# Patient Record
Sex: FEMALE | HISPANIC OR LATINO | Employment: UNEMPLOYED | URBAN - METROPOLITAN AREA
[De-identification: names, ages, dates, MRNs, and addresses within clinical notes are randomized per-mention and may not be internally consistent; named-entity substitution may affect disease eponyms.]

---

## 2017-01-31 ENCOUNTER — HOSPITAL ENCOUNTER (OUTPATIENT)
Dept: RADIOLOGY | Facility: MEDICAL CENTER | Age: 47
End: 2017-01-31

## 2017-05-19 ENCOUNTER — OFFICE VISIT (OUTPATIENT)
Dept: HEMATOLOGY ONCOLOGY | Facility: MEDICAL CENTER | Age: 47
End: 2017-05-19
Payer: MEDICAID

## 2017-05-19 VITALS
RESPIRATION RATE: 14 BRPM | DIASTOLIC BLOOD PRESSURE: 82 MMHG | SYSTOLIC BLOOD PRESSURE: 120 MMHG | OXYGEN SATURATION: 98 % | HEIGHT: 59 IN | WEIGHT: 121.25 LBS | HEART RATE: 66 BPM | TEMPERATURE: 97.9 F | BODY MASS INDEX: 24.44 KG/M2

## 2017-05-19 DIAGNOSIS — C16.9 METASTASIS FROM GASTRIC CANCER (HCC): ICD-10-CM

## 2017-05-19 DIAGNOSIS — C79.9 METASTASIS FROM GASTRIC CANCER (HCC): ICD-10-CM

## 2017-05-19 PROCEDURE — 99205 OFFICE O/P NEW HI 60 MIN: CPT | Performed by: INTERNAL MEDICINE

## 2017-05-19 RX ORDER — LORAZEPAM 0.5 MG/1
0.5 TABLET ORAL EVERY 4 HOURS PRN
Qty: 30 TAB | Refills: 0 | Status: SHIPPED | OUTPATIENT
Start: 2017-05-19 | End: 2017-05-19 | Stop reason: SDUPTHER

## 2017-05-19 RX ORDER — LORAZEPAM 0.5 MG/1
0.5 TABLET ORAL EVERY 8 HOURS PRN
Qty: 30 TAB | Refills: 1 | Status: SHIPPED | OUTPATIENT
Start: 2017-05-19 | End: 2017-07-17 | Stop reason: SDUPTHER

## 2017-05-19 ASSESSMENT — PAIN SCALES - GENERAL: PAINLEVEL: 2=MINIMAL-SLIGHT

## 2017-05-19 NOTE — PROGRESS NOTES
Consult Note: Oncology    Date of consultation: 5/19/2017 9:21 AM    Referring provider:     Reason for consultation: To establish care for metastatic gastric carcinoma. HER-2/elena negative,MSI-stable, low tumor mutational burden      History of presenting illness:       Melita Herrera   is a 46 y.o. year old  female who presented with epigastric pain and was found to have an adenocarcinomaof the greater curvature of the stomach measuring 4 x 3.8 cm on CT scan 8/15/16. The biopsy showed an adenocarcinoma with signet ring cells. She was referred for neoadjuvant chemotherapy and has received 3 cycles of TFOX with the last one his [facility] [place] [facility] on 10/27/16. Subsequent CT scan of the abdomen  on 10/20/60 which showed the 8 x 3 cm density on the ventral margin of the greater curvature of the stomach was relatively stable.      She was to be referred to her surgeon at [facility] who felt that Dr. Frederick would be a better option for her surgery and the patient was to have been called in surgery to be arranged. Unfortunately, this had not happened and she went to the emergency room at [facility] [place] [facility] on 12/23/16 with epigastric pain and evaluation was negative.    Unfortunately, there was some delay prior to her seeing Dr. Tyler. On, 1/25/17, the laparoscopic evaluation showed peritoneal spread and she was deemed inoperable. She received 2 more doses of TFOX. 4/19/17CT abdomen showed significant interval enlargement of irregular mass abutting the gastric wall and extending into the omentum with significant interval worsening of diffuse omental metastasis. Tiny 3 mm hypodense liver lesion possibly a new metastases along with a new 4 mm right lower lobe pulmonary nodule.    She wanted to switch her care. She was supposed to start Taxol Cyramza.  Foundation one testing showed p53 mutation and a few other mutation with no actionable mutations found.        Past Medical  History: No other past medical history No past medical history on file.    Past surgical history: Exploratory laparotomy as above No past surgical history on file.    Allergies:  Review of patient's allergies indicates no known allergies.    Medications:    Current Outpatient Prescriptions   Medication Sig Dispense Refill   • hydrocodone-acetaminophen (VICODIN) 5-500 MG TABS Take 1-2 Tabs by mouth every four hours as needed for 20 doses. 20 Each 0   • folic acid (FOLVITE) 1 MG TABS Take 1 mg by mouth every day.     • prenatal multivitamin (STUARTNATAL 1+1) 27-1 MG TABS Take 1 Tab by mouth every morning.       No current facility-administered medications for this visit.       Social History:     Social History     Social History   • Marital Status: Single     Spouse Name: N/A   • Number of Children: N/A   • Years of Education: N/A     Occupational History   • Not on file.     Social History Main Topics   • Smoking status: Never Smoker    • Smokeless tobacco: Not on file   • Alcohol Use: No   • Drug Use: No   • Sexual Activity: Not on file     Other Topics Concern   • Not on file     Social History Narrative       Family History:   No family history on file.    Review of Systems:  All other review of systems are negative except what was mentioned above in the HPI.    Constitutional: No fever, chills, weight loss ,malaise/fatigue.    HEENT: No new auditory or visual complaints. No sore throat and neck pain.     Respiratory:No new cough, sputum production, shortness of breath and wheezing.    Cardiovascular: No new chest pain, palpitations, orthopnea and leg swelling.    Gastrointestinal: No heartburn, nausea, vomiting ,, hematochezia or melena  . She has occasional abdominal discomfort    Genitourinary: Negative for dysuria, hematuria    Musculoskeletal: No new arthralgias or myalgias   Skin: Negative for rash and itching.    Neurological: Negative for focal weakness or headaches.    Endo/Heme/Allergies: No abnormal  "bleed/bruise.    Psychiatric/Behavioral: No new depression/anxiety.    Physical Exam:  Vitals:   /82 mmHg  Pulse 66  Temp(Src) 36.6 °C (97.9 °F)  Resp 14  Ht 1.499 m (4' 11.02\")  Wt 55 kg (121 lb 4.1 oz)  BMI 24.48 kg/m2  SpO2 98%  Breastfeeding? No    General: Not in acute distress, alert and oriented x 3  HEENT: No pallor, icterus. Oropharynx clear.   Neck: Supple, no palpable masses.  Lymph nodes: No palpable cervical, supraclavicular, axillary or inguinal lymphadenopathy.    CVS: regular rate and rhythm, no rubs or gallops  RESP: Clear to auscultate bilaterally, no wheezing or crackles.   ABD: Soft, non tender, non distended, positive bowel sounds, vague epigastric fullness  EXT: No edema or cyanosis  CNS: Alert and oriented x3, No focal deficits.  Skin- No rash      Labs:   No results for input(s): RBC, HEMOGLOBIN, HEMATOCRIT, PLATELETCT, PROTHROMBTM, APTT, INR, IRON, FERRITIN, TOTIRONBC in the last 72 hours.  Lab Results   Component Value Date/Time    SODIUM 133* 06/08/2012 11:24 AM    POTASSIUM 3.8 06/08/2012 11:24 AM    CHLORIDE 105 06/08/2012 11:24 AM    CO2 20 06/08/2012 11:24 AM    GLUCOSE 92 06/08/2012 11:24 AM    BUN 14 06/08/2012 11:24 AM    CREATININE 0.60 06/08/2012 11:24 AM        Assessment and Plan:  Metastatic gastric carcinoma-she was treated aggressively with Taxotere, 5-FU and oxaliplatin without major response. Unfortunately, there was some delay in her getting surgical evaluation early this year. She was deemed inoperable and had 2 more cycles of TFOX with further studies showing progression.     Had a long discussion with the patient through . Informed her that unfortunately she has a very aggressive cancer which has not responded to the standard agents. She is not a candidate for immunotherapy due to microsatellite stable pattern and low tumor mutational burden. P53 mutation also indicates poor prognosis. Discussed about the option of trying ramucirumab (Cyramza) at " 8 mg/kg every 2 weeks along with paclitaxel 80 mg/m² on day 1, 8, 15 of a 28 day cycle , which is the next FDA approved treatment. Informed her that most patients will have some stability without major response. Also discussed the potential adverse effects including but not limited to, neuropathy, fatigue infections, Cyramza induced to have worsening of cytopenias, small risk of perforation, vascular events including bleeding and clotting and hypertension.    She will receive chemotherapy education and she is agreeable to start treatment.     Complex patient requiring complex decision making. I have extensively reviewed her prior medical records as part of establishing care with me and formulated the plan. Total time spent 70 minutes.    Please note that this dictation was created using voice recognition software. I have made every reasonable attempt to correct obvious errors, but I expect that there are errors of grammar and possibly content that I did not discover before finalizing the note.      SIGNATURES:  Ramana Medrano    CC:  Pcp Pt States None  No ref. provider found

## 2017-05-19 NOTE — MR AVS SNAPSHOT
"        Melita Herrera   2017 9:00 AM   Office Visit   MRN: [id number]    Department:  [facility]   Dept Phone:  [phone]    Description:  Female : 1970   Provider:  Ramana Medrano M.D.           Reason for Visit     New Patient           Allergies as of 2017     No Known Allergies      You were diagnosed with     Metastasis from gastric cancer (CMS-HCC)   [0633820]         Vital Signs     Blood Pressure Pulse Temperature Respirations Height Weight    120/82 mmHg 66 36.6 °C (97.9 °F) 14 1.499 m (4' 11.02\") 55 kg (121 lb 4.1 oz)    Body Mass Index Oxygen Saturation Breastfeeding? Smoking Status          24.48 kg/m2 98% No Never Smoker         Basic Information     Date Of Birth Sex Race Ethnicity Preferred Language    1970 Female  or   Origin (Japanese,Norwegian,English,Congolese, etc) Japanese      Problem List              ICD-10-CM Priority Class Noted - Resolved    Metastasis from gastric cancer (CMS-HCC) C79.9, C16.9   2017 - Present      Health Maintenance     Patient has no pending health maintenance at this time      Current Immunizations     No immunizations on file.      Below and/or attached are the medications your provider expects you to take. Review all of your home medications and newly ordered medications with your provider and/or pharmacist. Follow medication instructions as directed by your provider and/or pharmacist. Please keep your medication list with you and share with your provider. Update the information when medications are discontinued, doses are changed, or new medications (including over-the-counter products) are added; and carry medication information at all times in the event of emergency situations     Allergies:  No Known Allergies          Medications  Valid as of: May 19, 2017 -  9:59 AM    Generic Name Brand Name Tablet Size Instructions for use    Folic Acid (Tab) FOLVITE 1 MG Take 1 mg by mouth every day.   "    Hydrocodone-Acetaminophen (Tab) VICODIN 5-500 MG Take 1-2 Tabs by mouth every four hours as needed for 20 doses.        LORazepam (Tab) ATIVAN 0.5 MG Take 1 Tab by mouth every 8 hours as needed for Anxiety.        Prenatal Vit-Fe Fumarate-FA (Tab) STUARTNATAL 1+1 27-1 MG Take 1 Tab by mouth every morning.        .                 Medicines prescribed today were sent to:     None      Medication refill instructions:       If your prescription bottle indicates you have medication refills left, it is not necessary to call your provider’s office. Please contact your pharmacy and they will refill your medication.    If your prescription bottle indicates you do not have any refills left, you may request refills at any time through one of the following ways: The online [employer] system (except Urgent Care), by calling your provider’s office, or by asking your pharmacy to contact your provider’s office with a refill request. Medication refills are processed only during regular business hours and may not be available until the next business day. Your provider may request additional information or to have a follow-up visit with you prior to refilling your medication.   *Please Note: Medication refills are assigned a new Rx number when refilled electronically. Your pharmacy may indicate that no refills were authorized even though a new prescription for the same medication is available at the pharmacy. Please request the medicine by name with the pharmacy before contacting your provider for a refill.        Your To Do List     Future Labs/Procedures Complete By Expires      As directed 5/19/2018    CBC WITH DIFFERENTIAL  As directed 5/19/2018    CEA  As directed 5/19/2018    COMP METABOLIC PANEL  As directed 5/19/2018         [employer] Status: Patient Declined

## 2017-05-23 ENCOUNTER — TELEPHONE (OUTPATIENT)
Dept: HEMATOLOGY ONCOLOGY | Facility: MEDICAL CENTER | Age: 47
End: 2017-05-23

## 2017-05-23 NOTE — TELEPHONE ENCOUNTER
RD consulted for dietary consult - patient's chemotherapy schedule is currently pending at this time.  RD to f/u with nutrition interview, along with Czech interpretor on patient's first day of chemotherapy treatment.  Please contact dietitian at [phone] with questions and/or concerns.

## 2017-05-23 NOTE — TELEPHONE ENCOUNTER
Addendum to documentation 5/23:  Per discussion with chemo education RN, patient will not receive active chemotherapy/Rtx at [facility] at this time d/t insurance reasons.  RD signing off at this time.  Please re-consult dietitian at [phone] prn.

## 2017-05-31 ENCOUNTER — TELEPHONE (OUTPATIENT)
Dept: HEMATOLOGY ONCOLOGY | Facility: MEDICAL CENTER | Age: 47
End: 2017-05-31

## 2017-05-31 ENCOUNTER — PATIENT OUTREACH (OUTPATIENT)
Dept: OTHER | Facility: MEDICAL CENTER | Age: 47
End: 2017-05-31

## 2017-05-31 NOTE — TELEPHONE ENCOUNTER
I received a call from a Maura, the patient's friend (ph: [phone]), stating the patient was in need of assistance. She was seen in our office once by Dr. Medrano in medical oncology but is now scheduled for chemo with no follow up visits in our office. She does not speak english very well and is having a very difficult time trying to navigate what she needs to be doing next and who she needs to be seeing. She is a Maori-speaking patient.     I spoke with Georgiana Aponte, medical oncology practice manager, in regards to this patient and she notified me that the patient has [place] but will be switching to [employer] insurance on 6/1/17.     I sent a message to Lashanda Rodrigez - Nurse Navigator, Rae Alejandro -  , and Renny Curry - financial counselor, and informed them all of the situation with this patient. Renny has been in contact with this patient and Lashanda and Rae have referrals that were sent their way.

## 2017-05-31 NOTE — PROGRESS NOTES
Through the use of an , spoke with pt regarding her concerns and understanding of the plan of care.  Pt stated that she has not had any chemo for 2 months.  She is worried that the tumors are growing because her pain is getting worse.  She has pain medicine that is helping her.  She stated that her pain has not been too bad, and she has enough medicine for about 1 week.  She is aware of her next chemo appointment on 6/14, and is concerned that she does not yet have another appointment with Dr. Medrano.  Explained that we would need to obtain prior authorization through the insurance before we can set up the appt, and this cannot be done until after the 1st.  Pt verbalized understanding.  Asked about current needs.  Pt indicated that she does not have money for food.  Explained that we may be able to help her obtain a daniel to help with the cost of food or other living expenses.  Told pt we would talk again in a few days.  Called and spoke with DAVID Lawson, who has been in contact with pt almost daily.  He plans to verify [employer] benefits tomorrow am.  He also stated that he received the applications back that he gave her.  He called her for additional information and to set up appointment.  Met with pt and Renny this pm.  Plan to continue to work with Renny and Rae to assist pt.  Contact information was given, and pt indicated no additional questions at this time.  [place]

## 2017-06-02 ENCOUNTER — PATIENT OUTREACH (OUTPATIENT)
Dept: OTHER | Facility: MEDICAL CENTER | Age: 47
End: 2017-06-02

## 2017-06-02 NOTE — PROGRESS NOTES
Pt called and spoke with Renny earlier today regarding increasing pain now radiating to the back.  Pt will be needing additional pain medication, but is still, technically, insured through [employer].  Spoke with Dr. Medrano regarding this and her increasing pain.  Dr. Medrano wrote for additional pain medication for pt.  Met with pt while she met with DAVID Lawson, who interpreted for ONN.  Pt was tearful when ONN arrived.  Renny later explained the situation at home that prompted the tears.  Pt stated that her pain has been at 10/10 several times over the last few days which is why she needed pain medication sooner that she originally stated.  She has been avoiding taking the pain medication because of the constipation she has been having.  Dr. Jordan had prescribed a stool softener which was not effective.  Explained to pt that she could add Miralax (frequently prescribed by Joanna and Dr. Medrano) to the stool softener to help her until her insurance situation is corrected.  Pt requested counseling to help with the children.  Informed pt that the [facility] has Palau[place] speaking counselors available, and enc her to try to get counseling there.  Renny plans to contact CRUZ Mcbride, upon her return to help pt obtain counseling and evaluate if other services are needed.  Pt stated that her oldest child brought home marijuana, and she is very concerned that her other children, especially the youngest, will be adversely effected by this.  Plan to follow up with Rae and Renny on 6/5/17 regarding progress with counseling and insurance.  dsw

## 2017-06-05 ENCOUNTER — TELEPHONE (OUTPATIENT)
Dept: HEMATOLOGY ONCOLOGY | Facility: MEDICAL CENTER | Age: 47
End: 2017-06-05

## 2017-06-05 NOTE — TELEPHONE ENCOUNTER
Hope called to inform our office to submit a STAT request for authorization for the patient's appointment in our office on June 13th. She informs me the previous office visit was approved and the patient's upcoming chemotherapy appointments are approved but we need to submit a new request for her next visit in our office.    Phone: [phone]  Fax: [phone]

## 2017-06-06 ENCOUNTER — TELEPHONE (OUTPATIENT)
Dept: HEMATOLOGY ONCOLOGY | Facility: MEDICAL CENTER | Age: 47
End: 2017-06-06

## 2017-06-06 NOTE — TELEPHONE ENCOUNTER
Spoke with Rayne, medical assistant for Karie Chaudhry at the [facility]. Informed her that we need their office to obtain a prior authorization for this patient so we can see her in our office at medical oncology. She agreed and our office phone number was given to her in case they had further questions.     Ph: [phone].

## 2017-06-09 ENCOUNTER — NON-PROVIDER VISIT (OUTPATIENT)
Dept: HEMATOLOGY ONCOLOGY | Facility: MEDICAL CENTER | Age: 47
End: 2017-06-09
Payer: MEDICAID

## 2017-06-09 DIAGNOSIS — C79.9 METASTASIS FROM GASTRIC CANCER (HCC): ICD-10-CM

## 2017-06-09 DIAGNOSIS — C16.9 METASTASIS FROM GASTRIC CANCER (HCC): ICD-10-CM

## 2017-06-09 RX ORDER — ONDANSETRON 4 MG/1
4 TABLET, FILM COATED ORAL EVERY 4 HOURS PRN
Qty: 30 TAB | Refills: 3 | Status: SHIPPED | OUTPATIENT
Start: 2017-06-09

## 2017-06-09 RX ORDER — PROCHLORPERAZINE MALEATE 10 MG
10 TABLET ORAL EVERY 6 HOURS PRN
Qty: 30 TAB | Refills: 3 | Status: SHIPPED | OUTPATIENT
Start: 2017-06-09

## 2017-06-09 NOTE — PROGRESS NOTES
Patient is established with Dr. Medrano for metaastatic colon cancer.  She will be starting on Taxol/Cyramza chemotherapy, and is here today for a pre-chemotherapy teaching appointment. Patient is accompanied by herself for today's visit.  A  was utilized during today's visit.    Educated patient on chemotherapy including but not limited to: Infusion Policies and procedures, cycling of chemotherapy, length of treatment, alopecia, myelosuppression, infection prevention, fatigue, GI distress, use of specific nausea medications, avoidance of alcoholic beverages and supplement medications, use of sunscreen, chemotherapy precautions at home, and invasive procedures. Patient was provided with drug specific handouts, [facility] chemotherapy and you book, [facility] eating hints book, [facility] side effects sheet. Patient was given tour of Infusion Services and shown where to park and check-in.     A total of 45 minutes was spent with this patient for chemotherapy education.

## 2017-06-09 NOTE — MR AVS SNAPSHOT
Melita Herrera   2017 1:30 PM   Non-Provider Visit   MRN: [id number]    Department:  [facility]   Dept Phone:  [phone]    Description:  Female : 1970   Provider:  ONC RN 1           Reason for Visit     Other chemo edu      Allergies as of 2017     No Known Allergies      Vital Signs     Smoking Status                   Never Smoker            Basic Information     Date Of Birth Sex Race Ethnicity Preferred Language    1970 Female  or   Origin (Uzbek,Citizen of [place],British,Jose, etc) Uzbek      Your appointments     2017  9:30 AM   Non Provider 1 with ONC MA 1   [facility] (--)    [address] [facility] [address], [address]  [place] [id number] [phone]           You will be receiving a confirmation call a few days before your appointment from our automated call confirmation system.            2017 11:00 AM   ONCOLOGY EST PATIENT 30 MIN with Joanna Aguilera A.P.NJose Elias   [facility] (--)    [address] [facility] [address], [address]  [place] [address] [phone]            2017  8:30 AM   New Chemo 3 with RN 4   [facility] ([facility] [place])    [address]  [place] [address]   [phone]            2017 11:30 AM   ONCOLOGY EST PATIENT 30 MIN with JON Jonas.P.NJose Elias   [facility] (--)    [address]  [place] [address]   [phone]            2017 11:30 AM   Est Chemo 3 with RN 4   [facility] ([facility] [place])    [address]  [place] [address]   [phone]            2017 10:30 AM   Est Chemo 3 with RN 3   [facility] ([facility] [place])    [address]  [place] [address]   [phone]            2017 10:00 AM   Non Provider 1 with ONC MA 1   [facility] (--)    [address], [address]  [place] [address]   [phone]           You will be receiving a confirmation call a few days before your appointment  from our automated call confirmation system.            Jul 11, 2017 11:00 AM   ONCOLOGY EST PATIENT 30 MIN with REKHA Jonas.   [facility] (--)    [address], [address]  [facility] [address]   [phone]            Jul 12, 2017  8:30 AM   Est Chemo 3 with RN 3   [facility] ([facility])    [address]  [place] [address]   [phone]            Jul 19, 2017  8:30 AM   Est Chemo 3 with RN 4   [facility] ([facility])    [address]  [place] [address]   [phone]            Jul 26, 2017 11:30 AM   Est Chemo 3 with RN 4   [facility] ([facility])    [address]  [facility] [address]   [phone]              Problem List              ICD-10-CM Priority Class Noted - Resolved    Metastasis from gastric cancer (CMS-HCC) C79.9, C16.9   5/19/2017 - Present      Health Maintenance        Date Due Completion Dates    IMM DTaP/Tdap/Td Vaccine (1 - Tdap) 12/11/1987 ---    PAP SMEAR 12/11/1989 ---    MAMMOGRAM 12/11/2008 ---            Current Immunizations     No immunizations on file.      Below and/or attached are the medications your provider expects you to take. Review all of your home medications and newly ordered medications with your provider and/or pharmacist. Follow medication instructions as directed by your provider and/or pharmacist. Please keep your medication list with you and share with your provider. Update the information when medications are discontinued, doses are changed, or new medications (including over-the-counter products) are added; and carry medication information at all times in the event of emergency situations     Allergies:  No Known Allergies          Medications  Valid as of: June 09, 2017 -  2:31 PM    Generic Name Brand Name Tablet Size Instructions for use    Folic Acid (Tab) FOLVITE 1 MG Take 1 mg by mouth every day.        Hydrocodone-Acetaminophen (Tab) VICODIN 5-500 MG Take 1-2 Tabs by mouth every four  hours as needed for 20 doses.        LORazepam (Tab) ATIVAN 0.5 MG Take 1 Tab by mouth every 8 hours as needed for Anxiety.        Ondansetron HCl (Tab) ZOFRAN 4 MG Take 1 Tab by mouth every four hours as needed for Nausea/Vomiting.        Prenatal Vit-Fe Fumarate-FA (Tab) STUARTNATAL 1+1 27-1 MG Take 1 Tab by mouth every morning.        Prochlorperazine Maleate (Tab) COMPAZINE 10 MG Take 1 Tab by mouth every 6 hours as needed.        .                 Medicines prescribed today were sent to:     None      Medication refill instructions:       If your prescription bottle indicates you have medication refills left, it is not necessary to call your provider’s office. Please contact your pharmacy and they will refill your medication.    If your prescription bottle indicates you do not have any refills left, you may request refills at any time through one of the following ways: The online [employer] system (except Urgent Care), by calling your provider’s office, or by asking your pharmacy to contact your provider’s office with a refill request. Medication refills are processed only during regular business hours and may not be available until the next business day. Your provider may request additional information or to have a follow-up visit with you prior to refilling your medication.   *Please Note: Medication refills are assigned a new Rx number when refilled electronically. Your pharmacy may indicate that no refills were authorized even though a new prescription for the same medication is available at the pharmacy. Please request the medicine by name with the pharmacy before contacting your provider for a refill.           [employer] Status: Patient Declined

## 2017-06-12 DIAGNOSIS — C16.9 METASTASIS FROM GASTRIC CANCER (HCC): ICD-10-CM

## 2017-06-12 DIAGNOSIS — C79.9 METASTASIS FROM GASTRIC CANCER (HCC): ICD-10-CM

## 2017-06-13 ENCOUNTER — NON-PROVIDER VISIT (OUTPATIENT)
Dept: HEMATOLOGY ONCOLOGY | Facility: MEDICAL CENTER | Age: 47
End: 2017-06-13
Payer: MEDICAID

## 2017-06-13 ENCOUNTER — OFFICE VISIT (OUTPATIENT)
Dept: HEMATOLOGY ONCOLOGY | Facility: MEDICAL CENTER | Age: 47
End: 2017-06-13
Payer: MEDICAID

## 2017-06-13 ENCOUNTER — TELEPHONE (OUTPATIENT)
Dept: HEMATOLOGY ONCOLOGY | Facility: MEDICAL CENTER | Age: 47
End: 2017-06-13

## 2017-06-13 ENCOUNTER — HOSPITAL ENCOUNTER (OUTPATIENT)
Facility: MEDICAL CENTER | Age: 47
End: 2017-06-13
Attending: INTERNAL MEDICINE
Payer: MEDICAID

## 2017-06-13 VITALS
OXYGEN SATURATION: 95 % | BODY MASS INDEX: 24.39 KG/M2 | RESPIRATION RATE: 16 BRPM | WEIGHT: 121 LBS | TEMPERATURE: 98.6 F | DIASTOLIC BLOOD PRESSURE: 80 MMHG | HEART RATE: 81 BPM | HEIGHT: 59 IN | SYSTOLIC BLOOD PRESSURE: 124 MMHG

## 2017-06-13 VITALS
DIASTOLIC BLOOD PRESSURE: 80 MMHG | BODY MASS INDEX: 24.59 KG/M2 | TEMPERATURE: 98.6 F | OXYGEN SATURATION: 95 % | HEIGHT: 59 IN | HEART RATE: 81 BPM | RESPIRATION RATE: 16 BRPM | SYSTOLIC BLOOD PRESSURE: 124 MMHG | WEIGHT: 121.98 LBS

## 2017-06-13 DIAGNOSIS — R10.9 LEFT FLANK PAIN: ICD-10-CM

## 2017-06-13 DIAGNOSIS — C16.9 METASTASIS FROM GASTRIC CANCER (HCC): ICD-10-CM

## 2017-06-13 DIAGNOSIS — C79.9 METASTASIS FROM GASTRIC CANCER (HCC): ICD-10-CM

## 2017-06-13 LAB
ALBUMIN SERPL BCP-MCNC: 3.6 G/DL (ref 3.2–4.9)
ALBUMIN/GLOB SERPL: 1.2 G/DL
ALP SERPL-CCNC: 88 U/L (ref 30–99)
ALT SERPL-CCNC: 13 U/L (ref 2–50)
ANION GAP SERPL CALC-SCNC: 8 MMOL/L (ref 0–11.9)
APPEARANCE UR: CLEAR
AST SERPL-CCNC: 24 U/L (ref 12–45)
B-HCG SERPL-ACNC: 0.9 MIU/ML (ref 0–5)
BASOPHILS # BLD AUTO: 0.2 % (ref 0–1.8)
BASOPHILS # BLD: 0.01 K/UL (ref 0–0.12)
BILIRUB SERPL-MCNC: 0.4 MG/DL (ref 0.1–1.5)
BILIRUB UR QL STRIP.AUTO: NEGATIVE
BUN SERPL-MCNC: 13 MG/DL (ref 8–22)
CALCIUM SERPL-MCNC: 9.3 MG/DL (ref 8.5–10.5)
CEA SERPL-MCNC: 1.2 NG/ML (ref 0–3)
CHLORIDE SERPL-SCNC: 104 MMOL/L (ref 96–112)
CO2 SERPL-SCNC: 26 MMOL/L (ref 20–33)
COLOR UR: YELLOW
CREAT SERPL-MCNC: 0.65 MG/DL (ref 0.5–1.4)
CULTURE IF INDICATED INDCX: NO UA CULTURE
EOSINOPHIL # BLD AUTO: 0.04 K/UL (ref 0–0.51)
EOSINOPHIL NFR BLD: 0.7 % (ref 0–6.9)
ERYTHROCYTE [DISTWIDTH] IN BLOOD BY AUTOMATED COUNT: 50.9 FL (ref 35.9–50)
GFR SERPL CREATININE-BSD FRML MDRD: >60 ML/MIN/1.73 M 2
GLOBULIN SER CALC-MCNC: 3.1 G/DL (ref 1.9–3.5)
GLUCOSE SERPL-MCNC: 121 MG/DL (ref 65–99)
GLUCOSE UR STRIP.AUTO-MCNC: NEGATIVE MG/DL
HCT VFR BLD AUTO: 39.4 % (ref 37–47)
HGB BLD-MCNC: 12.6 G/DL (ref 12–16)
IMM GRANULOCYTES # BLD AUTO: 0.03 K/UL (ref 0–0.11)
IMM GRANULOCYTES NFR BLD AUTO: 0.5 % (ref 0–0.9)
KETONES UR STRIP.AUTO-MCNC: NEGATIVE MG/DL
LEUKOCYTE ESTERASE UR QL STRIP.AUTO: NEGATIVE
LYMPHOCYTES # BLD AUTO: 0.75 K/UL (ref 1–4.8)
LYMPHOCYTES NFR BLD: 12.5 % (ref 22–41)
MCH RBC QN AUTO: 26.8 PG (ref 27–33)
MCHC RBC AUTO-ENTMCNC: 32 G/DL (ref 33.6–35)
MCV RBC AUTO: 83.8 FL (ref 81.4–97.8)
MICRO URNS: NORMAL
MONOCYTES # BLD AUTO: 0.38 K/UL (ref 0–0.85)
MONOCYTES NFR BLD AUTO: 6.3 % (ref 0–13.4)
NEUTROPHILS # BLD AUTO: 4.8 K/UL (ref 2–7.15)
NEUTROPHILS NFR BLD: 79.8 % (ref 44–72)
NITRITE UR QL STRIP.AUTO: NEGATIVE
NRBC # BLD AUTO: 0 K/UL
NRBC BLD AUTO-RTO: 0 /100 WBC
PH UR STRIP.AUTO: 5.5 [PH]
PLATELET # BLD AUTO: 332 K/UL (ref 164–446)
PMV BLD AUTO: 10.1 FL (ref 9–12.9)
POTASSIUM SERPL-SCNC: 4 MMOL/L (ref 3.6–5.5)
PROT SERPL-MCNC: 6.7 G/DL (ref 6–8.2)
PROT UR QL STRIP: NEGATIVE MG/DL
RBC # BLD AUTO: 4.7 M/UL (ref 4.2–5.4)
RBC UR QL AUTO: NEGATIVE
SODIUM SERPL-SCNC: 138 MMOL/L (ref 135–145)
SP GR UR STRIP.AUTO: 1.02
WBC # BLD AUTO: 6 K/UL (ref 4.8–10.8)

## 2017-06-13 PROCEDURE — 85025 COMPLETE CBC W/AUTO DIFF WBC: CPT

## 2017-06-13 PROCEDURE — 80053 COMPREHEN METABOLIC PANEL: CPT

## 2017-06-13 PROCEDURE — 84702 CHORIONIC GONADOTROPIN TEST: CPT

## 2017-06-13 PROCEDURE — 86304 IMMUNOASSAY TUMOR CA 125: CPT

## 2017-06-13 PROCEDURE — 36415 COLL VENOUS BLD VENIPUNCTURE: CPT | Performed by: NURSE PRACTITIONER

## 2017-06-13 PROCEDURE — 81003 URINALYSIS AUTO W/O SCOPE: CPT

## 2017-06-13 PROCEDURE — 99214 OFFICE O/P EST MOD 30 MIN: CPT | Performed by: NURSE PRACTITIONER

## 2017-06-13 PROCEDURE — 82378 CARCINOEMBRYONIC ANTIGEN: CPT

## 2017-06-13 ASSESSMENT — ENCOUNTER SYMPTOMS
CHILLS: 0
COUGH: 1
FEVER: 0
DIZZINESS: 0
VOMITING: 0
SHORTNESS OF BREATH: 0
PALPITATIONS: 0
WEIGHT LOSS: 0
NAUSEA: 0
FLANK PAIN: 1
BACK PAIN: 1
DIARRHEA: 0
HEADACHES: 0
CONSTIPATION: 0

## 2017-06-13 ASSESSMENT — PAIN SCALES - GENERAL
PAINLEVEL: 8=MODERATE-SEVERE PAIN
PAINLEVEL: 8=MODERATE-SEVERE PAIN

## 2017-06-13 NOTE — MR AVS SNAPSHOT
"Melita Herrera   2017 11:00 AM   Office Visit   MRN: [id number]    Department:  [facility]   Dept Phone:  [phone]    Description:  Female : 1970   Provider:  NASIM JonasPWILFREDO           Reason for Visit     Follow-Up           Allergies as of 2017     No Known Allergies      You were diagnosed with     Metastasis from gastric cancer (CMS-HCC)   [9316601]       Left flank pain   [863486]         Vital Signs     Blood Pressure Pulse Temperature Respirations Height Weight    124/80 mmHg 81 37 °C (98.6 °F) 16 1.499 m (4' 11.02\") 55.33 kg (121 lb 15.7 oz)    Body Mass Index Oxygen Saturation Smoking Status             24.62 kg/m2 95% Never Smoker          Basic Information     Date Of Birth Sex Race Ethnicity Preferred Language    1970 Female  or   Origin (Kazakh,Palestinian,Burundian,Jose, etc) Kazakh      Your appointments     2017  8:30 AM   New Chemo 3 with RN 4   Infusion Services ([facility])    [address] [address]   [phone]            2017 11:30 AM   ONCOLOGY EST PATIENT 30 MIN with NASIM JonasPWILFREDO   [facility] (--)    [address] [place] [id number] [phone]            2017 11:30 AM   Est Chemo 3 with RN 4   Infusion Services ([facility])    [address] [address]   [phone]            2017 10:30 AM   Est Chemo 3 with RN 3   Infusion Services ([facility])    [id number]  [facility] [address]   [phone]            2017 10:00 AM   Non Provider 1 with ONC MA 1   [facility] (--)    [address] [place] [address] [phone]           You will be receiving a confirmation call a few days before your appointment from our automated call confirmation system.            2017 11:00 AM   ONCOLOGY EST PATIENT 30 MIN with JON Jonas.PWILFREDO   [facility] (--)    [address], [address]  [place] [address]   [phone]            Jul 12, 2017  8:30 AM   Est Chemo 3 with RN 3   Infusion Services ([facility])    [address] [facility] [address]  [place] [address]   [phone]            Jul 19, 2017  8:30 AM   Est Chemo 3 with RN 4   Infusion Services ([facility])    [address] [facility] [address]  [place] [address]   [phone]            Jul 26, 2017 11:30 AM   Est Chemo 3 with RN 4   Infusion Services ([facility])    [address] [facility] [address]  [place] [address]   [phone]              Problem List              ICD-10-CM Priority Class Noted - Resolved    Metastasis from gastric cancer (CMS-HCC) C79.9, C16.9   5/19/2017 - Present      Health Maintenance        Date Due Completion Dates    IMM DTaP/Tdap/Td Vaccine (1 - Tdap) 12/11/1987 ---    PAP SMEAR 12/11/1989 ---    MAMMOGRAM 12/11/2008 ---            Current Immunizations     No immunizations on file.      Below and/or attached are the medications your provider expects you to take. Review all of your home medications and newly ordered medications with your provider and/or pharmacist. Follow medication instructions as directed by your provider and/or pharmacist. Please keep your medication list with you and share with your provider. Update the information when medications are discontinued, doses are changed, or new medications (including over-the-counter products) are added; and carry medication information at all times in the event of emergency situations     Allergies:  No Known Allergies          Medications  Valid as of: June 13, 2017 - 12:16 PM    Generic Name Brand Name Tablet Size Instructions for use    Folic Acid (Tab) FOLVITE 1 MG Take 1 mg by mouth every day.        Hydrocodone-Acetaminophen (Tab) VICODIN 5-500 MG Take 1-2 Tabs by mouth every four hours as needed for 20 doses.        LORazepam (Tab) ATIVAN 0.5 MG Take 1 Tab by mouth every 8 hours as needed for Anxiety.        Ondansetron HCl (Tab)  ZOFRAN 4 MG Take 1 Tab by mouth every four hours as needed for Nausea/Vomiting.        Prenatal Vit-Fe Fumarate-FA (Tab) STUARTNATAL 1+1 27-1 MG Take 1 Tab by mouth every morning.        Prochlorperazine Maleate (Tab) COMPAZINE 10 MG Take 1 Tab by mouth every 6 hours as needed.        .                 Medicines prescribed today were sent to:     [id number] - [place], [place] - [address] [facility]    [address] [place] [address]    Phone: [phone] Fax: [phone]    Open 24 Hours?: No      Medication refill instructions:       If your prescription bottle indicates you have medication refills left, it is not necessary to call your provider’s office. Please contact your pharmacy and they will refill your medication.    If your prescription bottle indicates you do not have any refills left, you may request refills at any time through one of the following ways: The online Jumblets system (except Urgent Care), by calling your provider’s office, or by asking your pharmacy to contact your provider’s office with a refill request. Medication refills are processed only during regular business hours and may not be available until the next business day. Your provider may request additional information or to have a follow-up visit with you prior to refilling your medication.   *Please Note: Medication refills are assigned a new Rx number when refilled electronically. Your pharmacy may indicate that no refills were authorized even though a new prescription for the same medication is available at the pharmacy. Please request the medicine by name with the pharmacy before contacting your provider for a refill.        Your To Do List     Future Labs/Procedures Complete By Expires    CT-ABDOMEN-PELVIS WITH & W/O  As directed 6/13/2018    HCG QUANTITATIVE  As directed 6/13/2018    URINALYSIS,CULTURE IF INDICATED  As directed 6/13/2018         Jumblets Status: Patient Declined

## 2017-06-13 NOTE — PROGRESS NOTES
Subjective:      Melita Herrera is a 46 y.o. female who presents for Follow-Up for gastric cancer.         HPI    Patient seen today in follow-up for gastric cancer. She is accompanied by her son for today's appointment. Bhutanese translation assisted with the  phone line today as well as a medical assistant in the office as needed throughout the appointment today.    Patient is scheduled to receive her first dose of Paclitaxel and Cyramza tomorrow. Patient to receive paclitaxel weekly and the Cyramza every other week. Patient denies fevers, chills, weight loss or fatigue. She is eating well. She denies pain or abdominal pain with eating. She denies chest pain, heart palpitations. She denies nausea, vomiting, diarrhea or constipation. Patient is voiding without difficulty. She does have significant left flank pain that started about a week ago. She stated she took Percocet with positive results sometimes, however she did take 2 pills yesterday which did not help her pain very much. She denies any hematuria or dysuria. She states her urine is clear and color. Patient denies dizziness or headaches.    No Known Allergies  Current Outpatient Prescriptions on File Prior to Visit   Medication Sig Dispense Refill   • hydrocodone-acetaminophen (VICODIN) 5-500 MG TABS Take 1-2 Tabs by mouth every four hours as needed for 20 doses. 20 Each 0   • ondansetron (ZOFRAN) 4 MG Tab tablet Take 1 Tab by mouth every four hours as needed for Nausea/Vomiting. 30 Tab 3   • prochlorperazine (COMPAZINE) 10 MG Tab Take 1 Tab by mouth every 6 hours as needed. 30 Tab 3   • lorazepam (ATIVAN) 0.5 MG Tab Take 1 Tab by mouth every 8 hours as needed for Anxiety. 30 Tab 1   • folic acid (FOLVITE) 1 MG TABS Take 1 mg by mouth every day.     • prenatal multivitamin (STUARTNATAL 1+1) 27-1 MG TABS Take 1 Tab by mouth every morning.       No current facility-administered medications on file prior to visit.         Review of Systems  "  Constitutional: Negative for fever, chills, weight loss and malaise/fatigue.   Respiratory: Positive for cough (very mid cough but it is intermittent). Negative for shortness of breath.    Cardiovascular: Negative for chest pain and palpitations.   Gastrointestinal: Negative for nausea, vomiting, diarrhea and constipation.   Genitourinary: Positive for flank pain (left flank pain started about 1 week ago. Pain medication does not help.). Negative for dysuria and hematuria.   Musculoskeletal: Positive for back pain.   Neurological: Negative for dizziness and headaches.          Objective:     /80 mmHg  Pulse 81  Temp(Src) 37 °C (98.6 °F)  Resp 16  Ht 1.499 m (4' 11.02\")  Wt 50.33 kg (110 lb 15.3 oz)  BMI 22.40 kg/m2  SpO2 95%     Physical Exam   Constitutional: She is oriented to person, place, and time. She appears well-developed and well-nourished. No distress.   HENT:   Head: Normocephalic and atraumatic.   Mouth/Throat: Oropharynx is clear and moist.   Eyes: Conjunctivae and EOM are normal. Pupils are equal, round, and reactive to light.   Cardiovascular: Normal rate, regular rhythm, normal heart sounds and intact distal pulses.  Exam reveals no gallop and no friction rub.    No murmur heard.  Pulmonary/Chest: Effort normal and breath sounds normal. No respiratory distress. She has no wheezes.   Abdominal: Soft. Bowel sounds are normal. She exhibits no distension. There is no tenderness.   Musculoskeletal: Normal range of motion. She exhibits tenderness (pain in the left flank area).   Neurological: She is alert and oriented to person, place, and time.   Skin: Skin is warm and dry. No rash noted. She is not diaphoretic. No erythema. No pallor.   Psychiatric: She has a normal mood and affect. Her behavior is normal.   Vitals reviewed.      Hospital Outpatient Visit on 06/13/2017   Component Date Value Ref Range Status   • Carcinoembryonic Antigen 06/13/2017 1.2  0.0 - 3.0 ng/mL Final    Comment: " Effective September 17, 2013 the quantitative determination  of Carcinoembryonic Antigen (CEA) will now be performed at  [facility].  The Access CEA paramagnetic  particle chemiluminescent immunoassay is used.  Results  obtained with different test methods or kits cannot be used interchangeably.  Measurement of CEA has been shown to be  clinically relevant in the management of patients with  colorectal, breast, lung, prostatic, pancreatic, and ovarian  carcinomas.  Smokers may have slightly elevated levels of CEA.     • WBC 06/13/2017 6.0  4.8 - 10.8 K/uL Final   • RBC 06/13/2017 4.70  4.20 - 5.40 M/uL Final   • Hemoglobin 06/13/2017 12.6  12.0 - 16.0 g/dL Final   • Hematocrit 06/13/2017 39.4  37.0 - 47.0 % Final   • MCV 06/13/2017 83.8  81.4 - 97.8 fL Final   • MCH 06/13/2017 26.8* 27.0 - 33.0 pg Final   • MCHC 06/13/2017 32.0* 33.6 - 35.0 g/dL Final   • RDW 06/13/2017 50.9* 35.9 - 50.0 fL Final   • Platelet Count 06/13/2017 332  164 - 446 K/uL Final   • MPV 06/13/2017 10.1  9.0 - 12.9 fL Final   • Neutrophils-Polys 06/13/2017 79.80* 44.00 - 72.00 % Final   • Lymphocytes 06/13/2017 12.50* 22.00 - 41.00 % Final   • Monocytes 06/13/2017 6.30  0.00 - 13.40 % Final   • Eosinophils 06/13/2017 0.70  0.00 - 6.90 % Final   • Basophils 06/13/2017 0.20  0.00 - 1.80 % Final   • Immature Granulocytes 06/13/2017 0.50  0.00 - 0.90 % Final   • Nucleated RBC 06/13/2017 0.00   Final   • Neutrophils (Absolute) 06/13/2017 4.80  2.00 - 7.15 K/uL Final    Includes immature neutrophils, if present.   • Lymphs (Absolute) 06/13/2017 0.75* 1.00 - 4.80 K/uL Final   • Monos (Absolute) 06/13/2017 0.38  0.00 - 0.85 K/uL Final   • Eos (Absolute) 06/13/2017 0.04  0.00 - 0.51 K/uL Final   • Baso (Absolute) 06/13/2017 0.01  0.00 - 0.12 K/uL Final   • Immature Granulocytes (abs) 06/13/2017 0.03  0.00 - 0.11 K/uL Final   • NRBC (Absolute) 06/13/2017 0.00   Final   • Sodium 06/13/2017 138  135 - 145 mmol/L Final   • Potassium  06/13/2017 4.0  3.6 - 5.5 mmol/L Final   • Chloride 06/13/2017 104  96 - 112 mmol/L Final   • Co2 06/13/2017 26  20 - 33 mmol/L Final   • Anion Gap 06/13/2017 8.0  0.0 - 11.9 Final   • Glucose 06/13/2017 121* 65 - 99 mg/dL Final   • Bun 06/13/2017 13  8 - 22 mg/dL Final   • Creatinine 06/13/2017 0.65  0.50 - 1.40 mg/dL Final   • Calcium 06/13/2017 9.3  8.5 - 10.5 mg/dL Final   • AST(SGOT) 06/13/2017 24  12 - 45 U/L Final   • ALT(SGPT) 06/13/2017 13  2 - 50 U/L Final   • Alkaline Phosphatase 06/13/2017 88  30 - 99 U/L Final   • Total Bilirubin 06/13/2017 0.4  0.1 - 1.5 mg/dL Final   • Albumin 06/13/2017 3.6  3.2 - 4.9 g/dL Final   • Total Protein 06/13/2017 6.7  6.0 - 8.2 g/dL Final   • Globulin 06/13/2017 3.1  1.9 - 3.5 g/dL Final   • A-G Ratio 06/13/2017 1.2   Final   • GFR If  06/13/2017 >60  >60 mL/min/1.73 m 2 Final   • GFR If Non  06/13/2017 >60  >60 mL/min/1.73 m 2 Final   • Color 06/13/2017 Yellow   Final   • Character 06/13/2017 Clear   Final   • Specific Gravity 06/13/2017 1.023  <1.035 Final   • Ph 06/13/2017 5.5  5.0-8.0 Final   • Glucose 06/13/2017 Negative  Negative mg/dL Final   • Ketones 06/13/2017 Negative  Negative mg/dL Final   • Protein 06/13/2017 Negative  Negative mg/dL Final   • Bilirubin 06/13/2017 Negative  Negative Final   • Nitrite 06/13/2017 Negative  Negative Final   • Leukocyte Esterase 06/13/2017 Negative  Negative Final   • Occult Blood 06/13/2017 Negative  Negative Final   • Micro Urine Req 06/13/2017 see below   Final    Comment: Microscopic examination not performed when specimen is clear  and chemically negative for protein, blood, leukocyte esterase  and nitrite.     • Culture Indicated 06/13/2017 No   Final   • cg 06/13/2017 0.9  0.0 - 5.0 mIU/mL Final    Comment: Gestational Age  Expected hCG  0.2-1 week       5-50  1-2  weeks         2-3 weeks        100-5,000  3-4 weeks        500-10,000  4-5 weeks        1,000-50,000  5-6 weeks         10,000-100,000  6-8 weeks        15,000-200,000  2-3 months       10,000-100,000  Normal value for Males and non-pregnant Females: <5.0 mIU/mL.  The safety and effectiveness of this assay for quantitative  measurement of Human Chorionic Gonadotropin (HCG) has been  established for assessment of pregnancy status only.            Assessment/Plan:     1. Metastasis from gastric cancer (CMS-HCC)  CT-ABDOMEN-PELVIS WITH & W/O    URINALYSIS,CULTURE IF INDICATED    HCG QUANTITATIVE   2. Left flank pain  URINALYSIS,CULTURE IF INDICATED       Plan  1. Patient is scheduled to start Paclitaxel and Cyramza tomorrow. I reviewed her CBC and CMP and okay to proceed with treatment as planned.    2. At the request of Dr. Medrano he would like to proceed with a repeat CT scan of the abdomen and pelvis with and without for restaging purposes to evaluate response to chemotherapy. Last CT scan was completed on April 25, 2017. Patient to have her CT scan completed this week.    3. I have ordered a quantitative hCG test to confirm the patient is not pregnant. When asked if she was pregnant she stated she was unable to give me an answer she has not had a menstrual cycle in approximately one year since she was started on chemotherapy originally. She is sexually active, so in order to confirm that she is not pregnant I have completed a pregnancy test. Tested come back negative for pregnancy today.    4. Patient is experiencing some left flank pain. She denies any urine symptoms, however I have proceeded with a UA. Patient's UA did come back which was clean. I spoke with Dr. Medrano who stated her pain may be related to progression of disease. CT scan will reevaluate her disease. Patient to continue with Percocet as needed.    5. Significant amount of time was spent with patient today utilizing translation as well as discussing her current clinical status. Patient to start chemotherapy tomorrow as planned and she will follow-up in one week  prior to day 2 of her treatment for toxicity check.          Please note that this dictation was created using voice recognition software. I have made every reasonable attempt to correct obvious errors, but I expect that there are errors of grammar and possibly content that I did not discover before finalizing the note.

## 2017-06-13 NOTE — MR AVS SNAPSHOT
Melita Herrera   2017 9:30 AM   Appointment   MRN: [id number]    Department:  [facility]   Dept Phone:  [phone]    Description:  Female : 1970   Provider:  ONC MA 1           Allergies as of 2017     No Known Allergies      Vital Signs     Smoking Status                   Never Smoker            Basic Information     Date Of Birth Sex Race Ethnicity Preferred Language    1970 Female  or   Origin (Amharic,Estonian,Nauruan,Jose, etc) Amharic      Your appointments     2017 11:00 AM   ONCOLOGY EST PATIENT 30 MIN with JON Jonas.P.NJose Elias   [facility] (--)    [address], [facility] [address]  [facility] [address] [phone]            2017  8:30 AM   New Chemo 3 with RN 4   Infusion Services ([facility])    [address]  [place] [address]   [phone]            2017 11:30 AM   ONCOLOGY EST PATIENT 30 MIN with JON Jonas.P.NJose Elias   [facility] (--)    [address], [facility] [address]  [facility] [address]   [phone]            2017 11:30 AM   Est Chemo 3 with RN 4   Infusion Services ([facility])    [address] [facility] [address]  [place] [address]   [phone]            2017 10:30 AM   Est Chemo 3 with RN 3   Infusion Services ([facility])    [address] [facility] [address]  [place] [address]   [phone]            2017 10:00 AM   Non Provider 1 with ONC MA 1   [facility] (--)    [address], [facility] [address]  [facility] [address]   [phone]           You will be receiving a confirmation call a few days before your appointment from our automated call confirmation system.            2017 11:00 AM   ONCOLOGY EST PATIENT 30 MIN with JON Jonas.P.NJose Elias   [facility] (--)    [address]  [facility] [address]   [phone]            2017  8:30 AM   Est Chemo 3 with RN 3   In[facility] ([facility])    [address] [facility]  [address]  [place] [address]   [phone]            Jul 19, 2017  8:30 AM   Est Chemo 3 with RN 4   Infusion Services ([facility])    [address] [facility] [place] [address]   [phone]            Jul 26, 2017 11:30 AM   Est Chemo 3 with RN 4   Infusion Services ([facility])    [address] [facility] [address]  [place] [address]   [phone]              Problem List              ICD-10-CM Priority Class Noted - Resolved    Metastasis from gastric cancer (CMS-HCC) C79.9, C16.9   5/19/2017 - Present      Health Maintenance        Date Due Completion Dates    IMM DTaP/Tdap/Td Vaccine (1 - Tdap) 12/11/1987 ---    PAP SMEAR 12/11/1989 ---    MAMMOGRAM 12/11/2008 ---            Current Immunizations     No immunizations on file.      Below and/or attached are the medications your provider expects you to take. Review all of your home medications and newly ordered medications with your provider and/or pharmacist. Follow medication instructions as directed by your provider and/or pharmacist. Please keep your medication list with you and share with your provider. Update the information when medications are discontinued, doses are changed, or new medications (including over-the-counter products) are added; and carry medication information at all times in the event of emergency situations     Allergies:  No Known Allergies          Medications  Valid as of: June 13, 2017 -  9:52 AM    Generic Name Brand Name Tablet Size Instructions for use    Folic Acid (Tab) FOLVITE 1 MG Take 1 mg by mouth every day.        Hydrocodone-Acetaminophen (Tab) VICODIN 5-500 MG Take 1-2 Tabs by mouth every four hours as needed for 20 doses.        LORazepam (Tab) ATIVAN 0.5 MG Take 1 Tab by mouth every 8 hours as needed for Anxiety.        Ondansetron HCl (Tab) ZOFRAN 4 MG Take 1 Tab by mouth every four hours as needed for Nausea/Vomiting.        Prenatal Vit-Fe Fumarate-FA (Tab) STUARTNATAL 1+1 27-1 MG Take 1 Tab by mouth every morning.         Prochlorperazine Maleate (Tab) COMPAZINE 10 MG Take 1 Tab by mouth every 6 hours as needed.        .                 Medicines prescribed today were sent to:     [id number] - [place], [place] - [address]    [address] [place] [address]    Phone: [phone] Fax: [phone]    Open 24 Hours?: No      Medication refill instructions:       If your prescription bottle indicates you have medication refills left, it is not necessary to call your provider’s office. Please contact your pharmacy and they will refill your medication.    If your prescription bottle indicates you do not have any refills left, you may request refills at any time through one of the following ways: The online DoNanza system (except Urgent Care), by calling your provider’s office, or by asking your pharmacy to contact your provider’s office with a refill request. Medication refills are processed only during regular business hours and may not be available until the next business day. Your provider may request additional information or to have a follow-up visit with you prior to refilling your medication.   *Please Note: Medication refills are assigned a new Rx number when refilled electronically. Your pharmacy may indicate that no refills were authorized even though a new prescription for the same medication is available at the pharmacy. Please request the medicine by name with the pharmacy before contacting your provider for a refill.           [employer] Status: Patient Declined

## 2017-06-13 NOTE — NON-PROVIDER
Melita Javier is a 46 y.o. female here for a non-provider visit for: Lab Draws  on 6/13/2017 at 3:36 PM    Procedure Performed: Peripheral IV    Anatomical site: Right Antecubital Area (AC)    Equipment used: 21 G    Labs drawn: CBC ,CMP ,HCG and UA ,    Ordering Provider: Pre at the requset of JUSTIN Quach places the order today 06/13/17     Bang By: Melita Puente, Med Ass't

## 2017-06-13 NOTE — TELEPHONE ENCOUNTER
I called left message regarding upcoming appointments. Also the results of her UA and Labs from today 06/13/17

## 2017-06-14 ENCOUNTER — TELEPHONE (OUTPATIENT)
Dept: HEMATOLOGY ONCOLOGY | Facility: MEDICAL CENTER | Age: 47
End: 2017-06-14

## 2017-06-14 ENCOUNTER — OUTPATIENT INFUSION SERVICES (OUTPATIENT)
Dept: ONCOLOGY | Facility: MEDICAL CENTER | Age: 47
End: 2017-06-14
Attending: INTERNAL MEDICINE
Payer: MEDICAID

## 2017-06-14 VITALS
SYSTOLIC BLOOD PRESSURE: 104 MMHG | RESPIRATION RATE: 18 BRPM | HEIGHT: 59 IN | HEART RATE: 64 BPM | OXYGEN SATURATION: 98 % | TEMPERATURE: 99.1 F | DIASTOLIC BLOOD PRESSURE: 70 MMHG | BODY MASS INDEX: 24.27 KG/M2 | WEIGHT: 120.37 LBS

## 2017-06-14 DIAGNOSIS — C79.9 METASTASIS FROM GASTRIC CANCER (HCC): ICD-10-CM

## 2017-06-14 DIAGNOSIS — C16.9 METASTASIS FROM GASTRIC CANCER (HCC): ICD-10-CM

## 2017-06-14 LAB — TSH SERPL DL<=0.005 MIU/L-ACNC: 0.44 UIU/ML (ref 0.3–3.7)

## 2017-06-14 PROCEDURE — 96375 TX/PRO/DX INJ NEW DRUG ADDON: CPT

## 2017-06-14 PROCEDURE — 700111 HCHG RX REV CODE 636 W/ 250 OVERRIDE (IP): Performed by: INTERNAL MEDICINE

## 2017-06-14 PROCEDURE — 700102 HCHG RX REV CODE 250 W/ 637 OVERRIDE(OP): Performed by: INTERNAL MEDICINE

## 2017-06-14 PROCEDURE — 700111 HCHG RX REV CODE 636 W/ 250 OVERRIDE (IP)

## 2017-06-14 PROCEDURE — 304540 HCHG NITRO SET VENT 2ND TUB

## 2017-06-14 PROCEDURE — A9270 NON-COVERED ITEM OR SERVICE: HCPCS | Performed by: INTERNAL MEDICINE

## 2017-06-14 PROCEDURE — 700102 HCHG RX REV CODE 250 W/ 637 OVERRIDE(OP): Performed by: NURSE PRACTITIONER

## 2017-06-14 PROCEDURE — A4212 NON CORING NEEDLE OR STYLET: HCPCS

## 2017-06-14 PROCEDURE — 96417 CHEMO IV INFUS EACH ADDL SEQ: CPT

## 2017-06-14 PROCEDURE — 96415 CHEMO IV INFUSION ADDL HR: CPT

## 2017-06-14 PROCEDURE — 96413 CHEMO IV INFUSION 1 HR: CPT

## 2017-06-14 PROCEDURE — 84443 ASSAY THYROID STIM HORMONE: CPT

## 2017-06-14 PROCEDURE — 700101 HCHG RX REV CODE 250

## 2017-06-14 PROCEDURE — 700105 HCHG RX REV CODE 258

## 2017-06-14 PROCEDURE — 700105 HCHG RX REV CODE 258: Performed by: INTERNAL MEDICINE

## 2017-06-14 PROCEDURE — A9270 NON-COVERED ITEM OR SERVICE: HCPCS | Performed by: NURSE PRACTITIONER

## 2017-06-14 RX ORDER — LORAZEPAM 1 MG/1
0.5 TABLET ORAL
Status: COMPLETED | OUTPATIENT
Start: 2017-06-14 | End: 2017-06-14

## 2017-06-14 RX ORDER — ACETAMINOPHEN 325 MG/1
650 TABLET ORAL ONCE
Status: COMPLETED | OUTPATIENT
Start: 2017-06-14 | End: 2017-06-14

## 2017-06-14 RX ORDER — LIDOCAINE HYDROCHLORIDE 10 MG/ML
INJECTION, SOLUTION INFILTRATION; PERINEURAL
Status: COMPLETED
Start: 2017-06-14 | End: 2017-06-14

## 2017-06-14 RX ADMIN — DIPHENHYDRAMINE HYDROCHLORIDE 50 MG: 50 INJECTION INTRAMUSCULAR; INTRAVENOUS at 09:25

## 2017-06-14 RX ADMIN — ACETAMINOPHEN 650 MG: 325 TABLET, FILM COATED ORAL at 09:24

## 2017-06-14 RX ADMIN — PACLITAXEL 120 MG: 6 INJECTION, SOLUTION, CONCENTRATE INTRAVENOUS at 12:02

## 2017-06-14 RX ADMIN — ONDANSETRON HYDROCHLORIDE 16 MG: 2 SOLUTION INTRAMUSCULAR; INTRAVENOUS at 09:40

## 2017-06-14 RX ADMIN — LIDOCAINE HYDROCHLORIDE: 10 INJECTION, SOLUTION INFILTRATION; PERINEURAL at 09:05

## 2017-06-14 RX ADMIN — DEXAMETHASONE SODIUM PHOSPHATE 12 MG: 4 INJECTION, SOLUTION INTRAMUSCULAR; INTRAVENOUS at 10:05

## 2017-06-14 RX ADMIN — HEPARIN 500 UNITS: 100 SYRINGE at 14:05

## 2017-06-14 RX ADMIN — LORAZEPAM 0.5 MG: 1 TABLET ORAL at 10:17

## 2017-06-14 RX ADMIN — FAMOTIDINE 20 MG: 10 INJECTION INTRAVENOUS at 09:27

## 2017-06-14 RX ADMIN — SODIUM CHLORIDE: 9 INJECTION, SOLUTION INTRAVENOUS at 10:40

## 2017-06-14 ASSESSMENT — PAIN SCALES - GENERAL: PAINLEVEL: NO PAIN

## 2017-06-14 NOTE — PROGRESS NOTES
Cyramza infused per MAR, pt tolerated well.  Taxol titrated from 150ml/hr to max rate 250ml/hr without incident. Pt slept majority of appointment.  Pt awoken upon completion of infusion, CNA  assisted RN.  Pt states she is feeling fine, almost as if treatment went by too fast.  Assured pt treatment was completed without incident as ordered.  Port flushed per protocol, de-accessed and gauze dressing applied.  Pt's daughter returned to take pt home.  Confirmed with patient and daughter when to return for next appt; pt discharged home in good spirits under no apparent distress.

## 2017-06-14 NOTE — PROGRESS NOTES
Chemotherapy Verification - PRIMARY RN      Height = 150cm  Weight = 54.6kg  BSA = 1.51m2       Medication: Ramicirumab  Dose: 8mg/kg  Calculated Dose: 436.8mg                             (In mg/m2, AUC, mg/kg)     Medication: Paclitaxel  Dose: 80mg/m2  Calculated Dose: 120.8mg                             (In mg/m2, AUC, mg/kg)    I confirm this process was performed independently with the BSA and all final chemotherapy dosing calculations congruent.  Any discrepancies of 5% or greater have been addressed with the chemotherapy pharmacist. The resolution of the discrepancy has been documented in the EPIC progress notes.

## 2017-06-14 NOTE — PROGRESS NOTES
"Pharmacy Chemotherapy Calculations Note:    Patient Name: Melita Herrera     Dx: metastatic gastric cancer  Cycle: 1 Day 1 Previous treatment: s/p 5 cycles TFOX at another facility, last dose 4/19/17   Protocol: Cyramza + Taxol  Ramucirumab (Cyramza) 8 mg/kg IV on Days 1 and 15  Paclitaxel (Taxol) 80 mg/m2 IV on Days 1, 8, and 15  28-day cycle    Lyssa JOYA et al. Ramucirumab plus paclitaxel versus placebo plus paclitaxel in patients with previously treated advanced gastric or gastro-oesophageal junction adenocarcinoma ([place]): a double-blind, randomised phase 3 trial. Lancet Oncol. 2014 Oct;15(11):1224-35.        /70 mmHg  Pulse 64  Temp(Src) 37.3 °C (99.1 °F)  Resp 18  Ht 1.5 m (4' 11.06\")  Wt 54.6 kg (120 lb 5.9 oz)  BMI 24.27 kg/m2  SpO2 98% Body surface area is 1.51 meters squared.    Labs from 6/13/17:  ANC~ 4800 Plt = 332 k Hgb = 12.6   SCr = 0.65 mg/dL CrCl ~85 mL/min Urine Protein = neg  LFT's = WNL  TBili = 0.4 TSH = 0.44     Ramucirumab (Cyramza) 8 mg/kg x 54.6 kg = 436.8 mg   <5% difference, okay to treat with final written dose = 440 mg IV    Paclitaxel (Taxol) 80 mg/m² x 1.51 m² = 120.8 mg   <5% difference, okay to treat with final written dose = 120 mg IV      Stacey Mckenzie, PharmD             "

## 2017-06-14 NOTE — PROGRESS NOTES
"Pharmacy Chemotherapy Verification    Patient Name: Melita Herrera  Dx: Metastatic gastric carcinoma  Protocol: Ramucirumab + Paclitaxel    *Dosing Reference*  Ramucirumab 8 mg/kg IV on days 1 and 15  Paclitaxel 80 mg/m2 IV on days 1, 8, and 15  28 day cycle  Lyssa H, Raymundo K, Marcus FRANCO, et al; [place] Study [facility]. Ramucirumab plus paclitaxel versus placebo plus paclitaxel in patients with previously treated advanced gastric or gastro-oesophageal junction adenocarcinoma ([place]): a double-blind, randomised phase 3 trial. Lancet Oncol. 2014;15(11):5762-9968.    Allergies: Review of patient's allergies indicates no known allergies.  /70 mmHg  Pulse 64  Temp(Src) 37.3 °C (99.1 °F)  Resp 18  Ht 1.5 m (4' 11.06\")  Wt 54.6 kg (120 lb 5.9 oz)  BMI 24.27 kg/m2  SpO2 98%  Body surface area is 1.51 meters squared.    Labs 6/13/17  ANC~ 4800  Plt = 332k Hgb = 12.6 SCr = 0.65 mg/dL CrCl ~ 84.25 ml/min  AST/ALT/AP = 24/13/88 Tbili = 0.4  Urine protein = Negative  Labs 6/14/17  TSH = 0.440     Drug Order   (Drug name, dose, route, IV Fluid & volume, frequency, number of doses) Cycle: 1      Previous treatment: C3 TFOX 10/27/16 ([facility])     Medication = Ramucirumab  Base Dose= 8 mg/kg  Calc Dose: Base Dose x 54.6 kg = 436.8 mg  Final Dose = 440 mg  Route = IV  Fluid & Volume =  mL  Admin Duration = Over 60 minutes   Administer first        <5% difference, OK to treat with final dose        Medication = PACLItaxel  Base Dose= 80 mg/m2  Calc Dose: Base Dose x 1.51 m2 = 120.8 mg  Final Dose = 120.8 mg  Route = 120 mg  Fluid & Volume =  mL  Admin Duration = Over 60 minutes           <5% difference, OK to treat with final dose          By my signature below, I confirm this process was performed independently with the BSA and all final chemotherapy dosing calculations congruent. I have reviewed the above chemotherapy order and that my calculation of the final dose and BSA (when applicable) " corroborate those calculations of the  pharmacist. Discrepancies of 5% or greater in the written dose have been addressed and documented within the EPIC Progress notes.    Signature: Mansi Robles, ZakD

## 2017-06-14 NOTE — PROGRESS NOTES
Chemotherapy Verification - SECONDARY RN       Height = 150 cm  Weight = 54.6 kg  BSA = 1.508 m2       Medication: Taxol  Dose: 80 mg/m2  Calculated Dose: 120.6 mg                             (In mg/m2, AUC, mg/kg)     Medication:  Cyramza  Dose: 8 mg/kg  Calculated Dose: 436.8 mg                             (In mg/m2, AUC, mg/kg)    I confirm that this process was performed independently.

## 2017-06-14 NOTE — PROGRESS NOTES
Patient arrived to Infusion for Day 1 Cycle 1 Taxol/Ramucirumab; reviewed medication guides with pharmacy prior to appt. Confirmed required labs/UA and pre-meds. Spoke to Valeria MILLER, received verbal order from Dr. Medrano for pre meds and lab orders for treatment plan.  Reviewed labs/UA results from 6/13/17, within parameters.  Pt at chairside with daughter; pt speaks only Swedish.   line in use, reviewed plan of care, current allergies/home medications and completed assessment.  Pt requested lidocaine prior to port access; lidocaine injected, port accessed in sterile field, flushed with brisk blood return. Bang additional TSH lab for Ramucirumab parameters.  Pre-meds infused.  Pt c/o of anxiety; ANGELA Summers at chairside assisting with interpretation.  Received po order for ativan from RENEE Willson.  Pt given ativan and is currently resting.

## 2017-06-15 LAB — CANCER AG125 SERPL-ACNC: 92.8 U/ML (ref 0–35)

## 2017-06-16 ENCOUNTER — HOSPITAL ENCOUNTER (OUTPATIENT)
Dept: RADIOLOGY | Facility: MEDICAL CENTER | Age: 47
End: 2017-06-16
Attending: NURSE PRACTITIONER
Payer: MEDICAID

## 2017-06-16 DIAGNOSIS — C16.9 METASTASIS FROM GASTRIC CANCER (HCC): ICD-10-CM

## 2017-06-16 DIAGNOSIS — C79.9 METASTASIS FROM GASTRIC CANCER (HCC): ICD-10-CM

## 2017-06-16 PROCEDURE — 74177 CT ABD & PELVIS W/CONTRAST: CPT

## 2017-06-16 PROCEDURE — 700117 HCHG RX CONTRAST REV CODE 255: Performed by: NURSE PRACTITIONER

## 2017-06-16 RX ADMIN — IOHEXOL 100 ML: 350 INJECTION, SOLUTION INTRAVENOUS at 10:45

## 2017-06-20 ENCOUNTER — TELEPHONE (OUTPATIENT)
Dept: HEMATOLOGY ONCOLOGY | Facility: MEDICAL CENTER | Age: 47
End: 2017-06-20

## 2017-06-20 ENCOUNTER — OFFICE VISIT (OUTPATIENT)
Dept: HEMATOLOGY ONCOLOGY | Facility: MEDICAL CENTER | Age: 47
End: 2017-06-20
Payer: MEDICAID

## 2017-06-20 VITALS
WEIGHT: 122.47 LBS | OXYGEN SATURATION: 98 % | HEIGHT: 59 IN | BODY MASS INDEX: 24.69 KG/M2 | TEMPERATURE: 99.9 F | HEART RATE: 58 BPM | RESPIRATION RATE: 16 BRPM | DIASTOLIC BLOOD PRESSURE: 60 MMHG | SYSTOLIC BLOOD PRESSURE: 102 MMHG

## 2017-06-20 DIAGNOSIS — G47.01 INSOMNIA DUE TO MEDICAL CONDITION: ICD-10-CM

## 2017-06-20 DIAGNOSIS — C16.9 METASTASIS FROM GASTRIC CANCER (HCC): ICD-10-CM

## 2017-06-20 DIAGNOSIS — C79.9 METASTASIS FROM GASTRIC CANCER (HCC): ICD-10-CM

## 2017-06-20 PROCEDURE — 99213 OFFICE O/P EST LOW 20 MIN: CPT | Performed by: NURSE PRACTITIONER

## 2017-06-20 ASSESSMENT — ENCOUNTER SYMPTOMS
CHILLS: 0
COUGH: 0
CONSTIPATION: 0
PALPITATIONS: 0
NAUSEA: 0
DIZZINESS: 0
DIARRHEA: 0
INSOMNIA: 1
SHORTNESS OF BREATH: 0
FEVER: 0
VOMITING: 0
WEIGHT LOSS: 0
HEADACHES: 0

## 2017-06-20 ASSESSMENT — PAIN SCALES - GENERAL: PAINLEVEL: NO PAIN

## 2017-06-20 NOTE — MR AVS SNAPSHOT
"        Melita ColeThang   2017 11:30 AM   Office Visit   MRN: [id number]    Department:  [facility]   Dept Phone:  [phone]    Description:  Female : 1970   Provider:  NASIM JonasP.N.           Reason for Visit     Follow-Up           Allergies as of 2017     No Known Allergies      You were diagnosed with     Metastasis from gastric cancer (CMS-HCC)   [3097095]       Insomnia due to medical condition   [011582]         Vital Signs     Blood Pressure Pulse Temperature Respirations Height Weight    102/60 mmHg 58 37.7 °C (99.9 °F) 16 1.5 m (4' 11.06\") 55.55 kg (122 lb 7.5 oz)    Body Mass Index Oxygen Saturation Smoking Status             24.69 kg/m2 98% Never Smoker          Basic Information     Date Of Birth Sex Race Ethnicity Preferred Language    1970 Female  or   Origin (Algerian,Grenadian,[place], etc) English      Your appointments     2017 11:30 AM   Est Chemo 3 with RN 4   [facility] ([facility])    [address]  [place] [address]   [phone]            2017 10:30 AM   Est Chemo 3 with RN 3   [facility] ([facility])    [address]  [place] [address]   [phone]            2017 10:00 AM   Non Provider 1 with ONC MA 1   [facility] (--)    [address], [facility] [address]  [facility] [place] [id number] [phone]           You will be receiving a confirmation call a few days before your appointment from our automated call confirmation system.            2017 11:00 AM   ONCOLOGY EST PATIENT 30 MIN with Joanna Aguilera, A.P.N.   [facility] (--)    [address], [address]  [facility] [place] [address] [phone]            2017  8:30 AM   Est Chemo 3 with RN 3   [facility] ([facility])    [address] [facility]  [place] [address]   [phone]            2017  8:30 AM   Est Chemo 3 with RN 4   [facility] ([facility])   " [address] [facility]  [place] [address]   [phone]            Jul 26, 2017 11:30 AM   Est Chemo 3 with RN 4   [facility] ([facility])    [address]  [place] [address]   [phone]            Aug 08, 2017  8:30 AM   Non Provider 1 with ONC MA 1   [facility] (--)    [address] [facility], [address]  [facility] [id number] [phone]           You will be receiving a confirmation call a few days before your appointment from our automated call confirmation system.            Aug 08, 2017  9:40 AM   ONCOLOGY EST PATIENT 30 MIN with Ramana Medrano M.D.   [facility] (--)    [address] [facility] [address]  [facility] [address]   [phone]            Aug 09, 2017  9:30 AM   Est Chemo 3 with RN 5   [facility] ([facility])    [address]  [facility] [address]   [phone]            Aug 16, 2017  9:30 AM   Est Chemo 3 with RN 5   [facility] ([facility])    [address]  [place] [address]   [phone]            Aug 23, 2017  9:30 AM   Est Chemo 3 with RN 5   [facility] ([facility])    [address] [address]  [place] [address]   [phone]              Problem List              ICD-10-CM Priority Class Noted - Resolved    Metastasis from gastric cancer (CMS-HCC) C79.9, C16.9   5/19/2017 - Present      Health Maintenance        Date Due Completion Dates    IMM DTaP/Tdap/Td Vaccine (1 - Tdap) 12/11/1987 ---    PAP SMEAR 12/11/1989 ---    MAMMOGRAM 12/11/2008 ---            Current Immunizations     No immunizations on file.      Below and/or attached are the medications your provider expects you to take. Review all of your home medications and newly ordered medications with your provider and/or pharmacist. Follow medication instructions as directed by your provider and/or pharmacist. Please keep your medication list with you and share with your provider. Update the information when medications are discontinued, doses are changed, or new  medications (including over-the-counter products) are added; and carry medication information at all times in the event of emergency situations     Allergies:  No Known Allergies          Medications  Valid as of: June 20, 2017 - 12:35 PM    Generic Name Brand Name Tablet Size Instructions for use    Folic Acid (Tab) FOLVITE 1 MG Take 1 mg by mouth every day.        Hydrocodone-Acetaminophen (Tab) VICODIN 5-500 MG Take 1-2 Tabs by mouth every four hours as needed for 20 doses.        LORazepam (Tab) ATIVAN 0.5 MG Take 1 Tab by mouth every 8 hours as needed for Anxiety.        Ondansetron HCl (Tab) ZOFRAN 4 MG Take 1 Tab by mouth every four hours as needed for Nausea/Vomiting.        Prenatal Vit-Fe Fumarate-FA (Tab) STUARTNATAL 1+1 27-1 MG Take 1 Tab by mouth every morning.        Prochlorperazine Maleate (Tab) COMPAZINE 10 MG Take 1 Tab by mouth every 6 hours as needed.        .                 Medicines prescribed today were sent to:     [id number] - [place], [place] - [address]    [address] [address]    Phone: [phone] Fax: [phone]    Open 24 Hours?: No      Medication refill instructions:       If your prescription bottle indicates you have medication refills left, it is not necessary to call your provider’s office. Please contact your pharmacy and they will refill your medication.    If your prescription bottle indicates you do not have any refills left, you may request refills at any time through one of the following ways: The online [facility] system (except Urgent Care), by calling your provider’s office, or by asking your pharmacy to contact your provider’s office with a refill request. Medication refills are processed only during regular business hours and may not be available until the next business day. Your provider may request additional information or to have a follow-up visit with you prior to refilling your medication.   *Please Note: Medication refills are assigned a new Rx  number when refilled electronically. Your pharmacy may indicate that no refills were authorized even though a new prescription for the same medication is available at the pharmacy. Please request the medicine by name with the pharmacy before contacting your provider for a refill.           MyChart Status: Patient Declined

## 2017-06-20 NOTE — PROGRESS NOTES
Subjective:      Melita Herrera is a 46 y.o. female who presents for Follow-Up for gastric cancer.         HPI     Patient seen today in follow-up for gastric cancer. She is accompanied by herself for today's appointment.  is utilized today for communication. Patient is currently on cycle one, day 8 of Cyramza and paclitaxel.    Patient tolerated her first dose of treatment very well. She denies any fevers, chills or weight loss. She states her appetite is okay. She denies any coughing, wheezing or shortness of breath. She denies chest pain, heart palpitations. She also denies any nausea, vomiting, diarrhea or constipation. She stated that she had loose stool for a few days after her chemotherapy but since then has been doing well. She may experience mild constipation at times from pain medication but she is having a bowel movement daily. She is voiding without difficulty. She denies any dizziness, or headaches. She stated that the pain in her back is very mild and has had much better than last week. She does take pain medication with positive results. She states she is taking approximately 1-2 pills a day if needed. Patient's biggest complaint at this time is insomnia. She stated approximately the day after chemotherapy she started having difficulty sleeping. She stated she is getting approximately 40 minutes of sleep at night. She did appear quite fatigued and tired today. She stated she tried taking Ativan which did help with her sleep one night.    Patient did repeat a CT scan for reevaluation and staging purposes. I reviewed the CT scan in detail with the patient today. Her CT scan does show progression of disease. I also discussed the CT scan with Dr. Dr. Medrano today as well. Patient to complete a CT scan after 2 months of treatment, which is 2 cycles.    No Known Allergies  Current Outpatient Prescriptions on File Prior to Visit   Medication Sig Dispense Refill   • ondansetron  "(ZOFRAN) 4 MG Tab tablet Take 1 Tab by mouth every four hours as needed for Nausea/Vomiting. 30 Tab 3   • prochlorperazine (COMPAZINE) 10 MG Tab Take 1 Tab by mouth every 6 hours as needed. 30 Tab 3   • lorazepam (ATIVAN) 0.5 MG Tab Take 1 Tab by mouth every 8 hours as needed for Anxiety. 30 Tab 1   • hydrocodone-acetaminophen (VICODIN) 5-500 MG TABS Take 1-2 Tabs by mouth every four hours as needed for 20 doses. 20 Each 0     No current facility-administered medications on file prior to visit.       Review of Systems   Constitutional: Negative for fever, chills and weight loss (appetite is okay).   Respiratory: Negative for cough and shortness of breath.    Cardiovascular: Negative for chest pain and palpitations.   Gastrointestinal: Negative for nausea, vomiting, diarrhea and constipation.   Genitourinary: Negative for dysuria.   Neurological: Negative for dizziness and headaches.   Psychiatric/Behavioral: The patient has insomnia.           Objective:     /60 mmHg  Pulse 58  Temp(Src) 37.7 °C (99.9 °F)  Resp 16  Ht 1.5 m (4' 11.06\")  Wt 55.55 kg (122 lb 7.5 oz)  BMI 24.69 kg/m2  SpO2 98%     Physical Exam   Constitutional: She is oriented to person, place, and time. She appears well-developed and well-nourished. No distress.   HENT:   Head: Normocephalic and atraumatic.   Mouth/Throat: Oropharynx is clear and moist.   Cardiovascular: Normal rate, regular rhythm and normal heart sounds.  Exam reveals no gallop and no friction rub.    No murmur heard.  Pulmonary/Chest: Effort normal and breath sounds normal. No respiratory distress. She has no wheezes.   Abdominal: Soft. Bowel sounds are normal. She exhibits no distension. There is no tenderness.   Musculoskeletal: Normal range of motion. She exhibits no edema or tenderness.   Neurological: She is alert and oriented to person, place, and time.   Skin: Skin is warm and dry. She is not diaphoretic.   Psychiatric: She has a normal mood and affect. Her " behavior is normal.   Vitals reviewed.             Assessment/Plan:     1. Metastasis from gastric cancer (CMS-HCC)  REFERRAL TO [facility]   2. Insomnia due to medical condition       Plan  1. Patient is doing well and tolerated her first dose of treatment well. She is due to receive day 8 of her first cycle tomorrow. Labs will be completed prior to her treatment tomorrow. If labs parameters okay to proceed with treatment as planned.    2. Patient experiencing insomnia since the start of her chemotherapy. She does have Ativan 0.5 mg to take at home. Initially was going to start patient on Restoril at 15 mg by mouth daily at bedtime, however I discussed further with patient and she stated she did get some relief with Ativan. I encouraged patient to take a dose of Ativan 0.5 mg at bedtime to see if that will help with her sleep. I did check patient to not drive a car after taking Ativan. She did verbalize understanding.    3. Patient did have questions regarding her food intake. I did educate patient on appropriate foods to eat. Her weight has been stable. However, I will proceed with the dietitian referral for further education for patient. Patient is in agreement with the plan.    4. Patient is to follow up in 2 weeks or sooner if needed.

## 2017-06-21 ENCOUNTER — OUTPATIENT INFUSION SERVICES (OUTPATIENT)
Dept: ONCOLOGY | Facility: MEDICAL CENTER | Age: 47
End: 2017-06-21
Attending: INTERNAL MEDICINE
Payer: MEDICAID

## 2017-06-21 ENCOUNTER — TELEPHONE (OUTPATIENT)
Dept: HEMATOLOGY ONCOLOGY | Facility: MEDICAL CENTER | Age: 47
End: 2017-06-21

## 2017-06-21 VITALS
SYSTOLIC BLOOD PRESSURE: 113 MMHG | TEMPERATURE: 98.8 F | BODY MASS INDEX: 24.53 KG/M2 | WEIGHT: 121.69 LBS | DIASTOLIC BLOOD PRESSURE: 71 MMHG | OXYGEN SATURATION: 100 % | RESPIRATION RATE: 18 BRPM | HEIGHT: 59 IN | HEART RATE: 92 BPM

## 2017-06-21 DIAGNOSIS — C79.9 METASTASIS FROM GASTRIC CANCER (HCC): ICD-10-CM

## 2017-06-21 DIAGNOSIS — C16.9 METASTASIS FROM GASTRIC CANCER (HCC): ICD-10-CM

## 2017-06-21 LAB
ALBUMIN SERPL BCP-MCNC: 3.1 G/DL (ref 3.2–4.9)
ALBUMIN/GLOB SERPL: 1.2 G/DL
ALP SERPL-CCNC: 94 U/L (ref 30–99)
ALT SERPL-CCNC: 61 U/L (ref 2–50)
ANION GAP SERPL CALC-SCNC: 6 MMOL/L (ref 0–11.9)
AST SERPL-CCNC: 57 U/L (ref 12–45)
BASOPHILS # BLD AUTO: 0.3 % (ref 0–1.8)
BASOPHILS # BLD: 0.01 K/UL (ref 0–0.12)
BILIRUB SERPL-MCNC: 0.3 MG/DL (ref 0.1–1.5)
BUN SERPL-MCNC: 17 MG/DL (ref 8–22)
CALCIUM SERPL-MCNC: 8.5 MG/DL (ref 8.5–10.5)
CHLORIDE SERPL-SCNC: 108 MMOL/L (ref 96–112)
CO2 SERPL-SCNC: 26 MMOL/L (ref 20–33)
CREAT SERPL-MCNC: 0.58 MG/DL (ref 0.5–1.4)
EOSINOPHIL # BLD AUTO: 0.05 K/UL (ref 0–0.51)
EOSINOPHIL NFR BLD: 1.6 % (ref 0–6.9)
ERYTHROCYTE [DISTWIDTH] IN BLOOD BY AUTOMATED COUNT: 51.1 FL (ref 35.9–50)
GFR SERPL CREATININE-BSD FRML MDRD: >60 ML/MIN/1.73 M 2
GLOBULIN SER CALC-MCNC: 2.5 G/DL (ref 1.9–3.5)
GLUCOSE SERPL-MCNC: 116 MG/DL (ref 65–99)
HCT VFR BLD AUTO: 37.5 % (ref 37–47)
HGB BLD-MCNC: 11.8 G/DL (ref 12–16)
IMM GRANULOCYTES # BLD AUTO: 0.01 K/UL (ref 0–0.11)
IMM GRANULOCYTES NFR BLD AUTO: 0.3 % (ref 0–0.9)
LYMPHOCYTES # BLD AUTO: 0.92 K/UL (ref 1–4.8)
LYMPHOCYTES NFR BLD: 30.1 % (ref 22–41)
MCH RBC QN AUTO: 26.9 PG (ref 27–33)
MCHC RBC AUTO-ENTMCNC: 31.5 G/DL (ref 33.6–35)
MCV RBC AUTO: 85.4 FL (ref 81.4–97.8)
MONOCYTES # BLD AUTO: 0.16 K/UL (ref 0–0.85)
MONOCYTES NFR BLD AUTO: 5.2 % (ref 0–13.4)
NEUTROPHILS # BLD AUTO: 1.91 K/UL (ref 2–7.15)
NEUTROPHILS NFR BLD: 62.5 % (ref 44–72)
NRBC # BLD AUTO: 0 K/UL
NRBC BLD AUTO-RTO: 0 /100 WBC
PLATELET # BLD AUTO: 300 K/UL (ref 164–446)
PMV BLD AUTO: 9.8 FL (ref 9–12.9)
POTASSIUM SERPL-SCNC: 4 MMOL/L (ref 3.6–5.5)
PROT SERPL-MCNC: 5.6 G/DL (ref 6–8.2)
RBC # BLD AUTO: 4.39 M/UL (ref 4.2–5.4)
SODIUM SERPL-SCNC: 140 MMOL/L (ref 135–145)
WBC # BLD AUTO: 3.1 K/UL (ref 4.8–10.8)

## 2017-06-21 PROCEDURE — 85025 COMPLETE CBC W/AUTO DIFF WBC: CPT

## 2017-06-21 PROCEDURE — 304540 HCHG NITRO SET VENT 2ND TUB

## 2017-06-21 PROCEDURE — A4212 NON CORING NEEDLE OR STYLET: HCPCS

## 2017-06-21 PROCEDURE — 700101 HCHG RX REV CODE 250

## 2017-06-21 PROCEDURE — 700111 HCHG RX REV CODE 636 W/ 250 OVERRIDE (IP): Performed by: INTERNAL MEDICINE

## 2017-06-21 PROCEDURE — 80053 COMPREHEN METABOLIC PANEL: CPT

## 2017-06-21 PROCEDURE — 96375 TX/PRO/DX INJ NEW DRUG ADDON: CPT

## 2017-06-21 PROCEDURE — 96413 CHEMO IV INFUSION 1 HR: CPT

## 2017-06-21 PROCEDURE — 96415 CHEMO IV INFUSION ADDL HR: CPT

## 2017-06-21 PROCEDURE — 700102 HCHG RX REV CODE 250 W/ 637 OVERRIDE(OP): Performed by: INTERNAL MEDICINE

## 2017-06-21 PROCEDURE — 700111 HCHG RX REV CODE 636 W/ 250 OVERRIDE (IP)

## 2017-06-21 PROCEDURE — A9270 NON-COVERED ITEM OR SERVICE: HCPCS | Performed by: INTERNAL MEDICINE

## 2017-06-21 PROCEDURE — 700105 HCHG RX REV CODE 258: Performed by: INTERNAL MEDICINE

## 2017-06-21 RX ORDER — ACETAMINOPHEN 325 MG/1
650 TABLET ORAL ONCE
Status: COMPLETED | OUTPATIENT
Start: 2017-06-21 | End: 2017-06-21

## 2017-06-21 RX ORDER — LIDOCAINE HYDROCHLORIDE 10 MG/ML
INJECTION, SOLUTION INFILTRATION; PERINEURAL
Status: COMPLETED
Start: 2017-06-21 | End: 2017-06-21

## 2017-06-21 RX ORDER — LORAZEPAM 1 MG/1
0.5 TABLET ORAL
Status: COMPLETED | OUTPATIENT
Start: 2017-06-21 | End: 2017-06-21

## 2017-06-21 RX ADMIN — PACLITAXEL 120 MG: 6 INJECTION, SOLUTION, CONCENTRATE INTRAVENOUS at 14:09

## 2017-06-21 RX ADMIN — ACETAMINOPHEN 650 MG: 325 TABLET, FILM COATED ORAL at 13:22

## 2017-06-21 RX ADMIN — ONDANSETRON HYDROCHLORIDE 16 MG: 2 SOLUTION INTRAMUSCULAR; INTRAVENOUS at 13:00

## 2017-06-21 RX ADMIN — DIPHENHYDRAMINE HYDROCHLORIDE 50 MG: 50 INJECTION INTRAMUSCULAR; INTRAVENOUS at 13:22

## 2017-06-21 RX ADMIN — DEXAMETHASONE SODIUM PHOSPHATE 12 MG: 4 INJECTION, SOLUTION INTRAMUSCULAR; INTRAVENOUS at 13:43

## 2017-06-21 RX ADMIN — LORAZEPAM 0.5 MG: 1 TABLET ORAL at 14:01

## 2017-06-21 RX ADMIN — FAMOTIDINE 20 MG: 10 INJECTION INTRAVENOUS at 13:43

## 2017-06-21 RX ADMIN — LIDOCAINE HYDROCHLORIDE: 10 INJECTION, SOLUTION INFILTRATION; PERINEURAL at 11:30

## 2017-06-21 RX ADMIN — HEPARIN 500 UNITS: 100 SYRINGE at 16:01

## 2017-06-21 ASSESSMENT — PAIN SCALES - GENERAL: PAINLEVEL: NO PAIN

## 2017-06-21 NOTE — PROGRESS NOTES
Chemotherapy Verification - PRIMARY RN      Height = 150 cm  Weight = 55.2 kg  BSA = 1.52 m^2       Medication: Taxol  Dose: 80 mg/m^2  Calculated Dose: 121.6 mg                             (In mg/m2, AUC, mg/kg)     I confirm this process was performed independently with the BSA and all final chemotherapy dosing calculations congruent.  Any discrepancies of 5% or greater have been addressed with the chemotherapy pharmacist. The resolution of the discrepancy has been documented in the EPIC progress notes.

## 2017-06-21 NOTE — PROGRESS NOTES
"Pharmacy Chemotherapy Verification    Patient Name: Melita Herrera  Dx: Metastatic gastric carcinoma    Protocol: Ramucirumab + Paclitaxel    Ramucirumab 8 mg/kg IV on days 1 and 15  Paclitaxel 80 mg/m2 IV on days 1, 8, and 15  28 day cycle  Lyssa H, Raymundo K, Marcus Barraza E, et al; [place] Study [facility]. Ramucirumab plus paclitaxel versus placebo plus paclitaxel in patients with previously treated advanced gastric or gastro-oesophageal junction adenocarcinoma ([place]): a double-blind, randomised phase 3 trial. Lancet Oncol. 2014;15(11):4855-1140.    Allergies: Review of patient's allergies indicates no known allergies.  /71 mmHg  Pulse 92  Temp(Src) 37.1 °C (98.8 °F)  Resp 18  Ht 1.5 m (4' 11.06\")  Wt 55.2 kg (121 lb 11.1 oz)  BMI 24.53 kg/m2  SpO2 100%  Body surface area is 1.52 meters squared.    ANC~ 1910 Plt = 300k   Hgb = 11.8     SCr = 0.58mg/dL CrCl ~ 86mL/min   AST/ALT/AP = 57/61/94 TBili = 0.3     Labs 6/14/17  TSH = 0.440     Drug Order   (Drug name, dose, route, IV Fluid & volume, frequency, number of doses) Cycle: 1 Day 8      Previous treatment: C1D1 6/14/17     Medication = PACLItaxel  Base Dose= 80 mg/m2  Calc Dose: Base Dose x 1.52 m2 = 121.6 mg  Final Dose = 120 mg  Route = IV  Fluid & Volume =  mL  Admin Duration = Over 60 minutes           <5% difference, OK to treat with final dose          By my signature below, I confirm this process was performed independently with the BSA and all final chemotherapy dosing calculations congruent. I have reviewed the above chemotherapy order and that my calculation of the final dose and BSA (when applicable) corroborate those calculations of the  pharmacist. Discrepancies of 5% or greater in the written dose have been addressed and documented within the [facility] Progress notes.    Stacey Mckenzie, PharmD         "

## 2017-06-21 NOTE — PROGRESS NOTES
Chemotherapy Verification - SECONDARY RN       Height = 59.06in  Weight = 55.2kg  BSA = 1.51m2       Medication: Taxol  Dose: 80mg/m2  Calculated Dose: 120.8mg                            (In mg/m2, AUC, mg/kg)       I confirm that this process was performed independently.

## 2017-06-21 NOTE — PROGRESS NOTES
"Pharmacy Chemotherapy Calculations Note:    Patient Name: Melita Herrera     Dx: metastatic gastric cancer    Cycle: 1 Day 8 Previous treatment: C1D1 = 6/14/17     Protocol: Cyramza + Taxol  Ramucirumab (Cyramza) 8 mg/kg IV on Days 1 and 15  Paclitaxel (Taxol) 80 mg/m2 IV on Days 1, 8, and 15  28-day cycle    Lyssa JOYA, et al. Ramucirumab plus paclitaxel versus placebo plus paclitaxel in patients with previously treated advanced gastric or gastro-oesophageal junction adenocarcinoma ([place]): a double-blind, randomised phase 3 trial. Lancet Oncol. 2014 Oct;15(11):1224-35.        /71 mmHg  Pulse 92  Temp(Src) 37.1 °C (98.8 °F)  Resp 18  Ht 1.5 m (4' 11.06\")  Wt 55.2 kg (121 lb 11.1 oz)  BMI 24.53 kg/m2  SpO2 100% Body surface area is 1.52 meters squared.    Labs 6/21/17  ANC~ 1900 Plt = 300k Hgb = 11.8   SCr = 0.58 mg/dL CrCl ~86 mL/min  (using min SCr 0.7) Urine Protein = neg  LFT's = 57/61/94  (no dose adjustment necessary) TBili = 0.3   Labs 6/14/17  TSH = 0.44      Paclitaxel (Taxol) 80 mg/m² x 1.52 m² = 121.6 mg   <5% difference, okay to treat with final written dose = 120 mg IV    Pietro Marsh, PharmD               "

## 2017-06-21 NOTE — PROGRESS NOTES
Patient here Day 8 Taxol. Lidocaine used prior to accessing port. Port accessed; labs drawn per MD order. Labs within parameters to proceed with treatment. Pre-medications given per MAR. Patient became anxious; PRN PO ativan given per MAR. Taxol given MAR; titrated slowly due to second dose of Taxol. Heparin instilled prior to de-accessing port. Port de-accessed; gauze/tape applied to site. Next appointment scheduled. Discharged to self care; no apparent distress noted.

## 2017-06-22 ENCOUNTER — DOCUMENTATION (OUTPATIENT)
Dept: HEMATOLOGY ONCOLOGY | Facility: MEDICAL CENTER | Age: 47
End: 2017-06-22

## 2017-06-22 NOTE — PROGRESS NOTES
"Late entry from 6/21:    Nutrition Services:  Per 2002 Nutritional Risk Screening guidelines, patient with score = 2.  Categorized as moderate level of impaired nutritional status, as evidenced by hypermetabolic disease state, poor-moderate appetite at this time, pt reports.     Pt Dx: metastatic gastric carcinoma, per MD documentation.    Hx of no past medical history, per chart documentation.     RD met with patient today to establish rapport and review estimated nutrient needs for weight maintenance throughout cancer treatment.  Pt is present for her second session of chemotherapy, RN states.  She is Chinese-speaking only; phone  was provided today.  Patient reports that her appetite has been \"ok\"; denies N/V at this time.  She tolerates all types and textures of food at this time; noted fruit milkshake at patient's bedside during interview (~30% had been consumed).    · RD reviewed potential nutritionally pertinent side effects during chemotherapy treatment - loss of taste, N/V, diarrhea, constipation - no issues at this time, pt reports.  · RD reviewed list of high protein/high fat snacks to encourage TID that are softer on the GI tract, if PO intake starts to decline.  Pt verbalizes very good understanding.     Labs per 6/21: glucose 116, AST/ALT 57/61  Meds: Folic Acid, Zofran, Compazine  Weight: 121 lbs (UBW = 125 lbs, pt states) (% wt change x2 months = 3.2; significant loss).    Plan/Recommend:  Additional goals as stated above.     FEMALE  cm/kg     Nutrition Needs Assessment:       Height (inches) 59 149.86     Weight (lbs) 121.0 55.00     Age (years) 46      BMI 24.51      Total Daily Calorie Needs per MSJ x1.4  1520 28 kcal/kg   Total Daily Protein Needs (1.2-1.5 g/kg Act BW) 66 83     Daily Fluids (30 mL per Act BW) 1650            "

## 2017-06-26 DIAGNOSIS — C79.9 METASTASIS FROM GASTRIC CANCER (HCC): ICD-10-CM

## 2017-06-26 DIAGNOSIS — C16.9 METASTASIS FROM GASTRIC CANCER (HCC): ICD-10-CM

## 2017-06-27 ENCOUNTER — TELEPHONE (OUTPATIENT)
Dept: HEMATOLOGY ONCOLOGY | Facility: MEDICAL CENTER | Age: 47
End: 2017-06-27

## 2017-06-27 ENCOUNTER — NON-PROVIDER VISIT (OUTPATIENT)
Dept: HEMATOLOGY ONCOLOGY | Facility: MEDICAL CENTER | Age: 47
End: 2017-06-27
Payer: MEDICAID

## 2017-06-27 VITALS
OXYGEN SATURATION: 95 % | SYSTOLIC BLOOD PRESSURE: 102 MMHG | TEMPERATURE: 97.6 F | DIASTOLIC BLOOD PRESSURE: 60 MMHG | BODY MASS INDEX: 24.39 KG/M2 | RESPIRATION RATE: 14 BRPM | HEART RATE: 83 BPM | WEIGHT: 121 LBS | HEIGHT: 59 IN

## 2017-06-27 DIAGNOSIS — C79.9 METASTASIS FROM GASTRIC CANCER (HCC): ICD-10-CM

## 2017-06-27 DIAGNOSIS — C16.9 METASTASIS FROM GASTRIC CANCER (HCC): ICD-10-CM

## 2017-06-27 PROCEDURE — 36415 COLL VENOUS BLD VENIPUNCTURE: CPT | Performed by: INTERNAL MEDICINE

## 2017-06-27 ASSESSMENT — PAIN SCALES - GENERAL: PAINLEVEL: 3=SLIGHT PAIN

## 2017-06-27 NOTE — TELEPHONE ENCOUNTER
Mikaela ANDERSON I called patient regarding her sleep during the evening. Patient stated that she is doing ok but is still restless .

## 2017-06-27 NOTE — MR AVS SNAPSHOT
"        Melita ColeThang   2017 11:00 AM   Non-Provider Visit   MRN: [id number]    Department:  [facility]   Dept Phone:  [phone]    Description:  Female : 1970   Provider:  ONC MA 1           Reason for Visit     Other labs      Allergies as of 2017     No Known Allergies      You were diagnosed with     Metastasis from gastric cancer (CMS-HCC)   [[id number]]         Vital Signs     Blood Pressure Pulse Temperature Respirations Height Weight    102/60 mmHg 83 36.4 °C (97.6 °F) 14 1.5 m (4' 11.06\") 54.885 kg (121 lb)    Body Mass Index Oxygen Saturation Smoking Status             24.39 kg/m2 95% Never Smoker          Basic Information     Date Of Birth Sex Race Ethnicity Preferred Language    1970 Female  or   Origin (Tajik,Bangladeshi,Tunisian,Jose, etc) English      Your appointments     2017 10:30 AM   Est Chemo 3 with RN 3   Infusion Services ([facility])    [address] [facility] [address]  [place] [address]   [phone]            2017 10:00 AM   Non Provider 1 with ONC MA 1   [facility] (--)    [address], [address]  [facility] [address] [phone]           You will be receiving a confirmation call a few days before your appointment from our automated call confirmation system.            2017 11:00 AM   ONCOLOGY EST PATIENT 30 MIN with LEO Jonas   [facility] (--)    [address], [address]  [facility] [address] [phone]            2017  8:30 AM   Est Chemo 3 with RN 3   Infusion Services ([facility])    [address] [facility] [address]  [place] [address]   [phone]            2017  8:30 AM   Est Chemo 3 with RN 4   Infusion Services ([facility])    [address] [facility] [address]  [place] [address]   [phone]            2017 11:30 AM   Est Chemo 3 with RN 4   Infusion Services ([facility])    [address]  [place] [address]   [phone]            Aug " 08, 2017  8:30 AM   Non Provider 1 with ONC MA 1   [facility] (--)    [address] [facility], [address]  [place] [address] [phone]           You will be receiving a confirmation call a few days before your appointment from our automated call confirmation system.            Aug 08, 2017  9:40 AM   ONCOLOGY EST PATIENT 30 MIN with Ramana Medrano M.D.   [facility] (--)    [address] [facility], [address]  [place] [address] [phone]            Aug 09, 2017  9:30 AM   Est Chemo 3 with RN 5   Infusion Services ([facility])    [address] [facility] [address]  [place] [address]   [phone]            Aug 16, 2017  9:30 AM   Est Chemo 3 with RN 5   Infusion Services ([facility])    [address] [facility] [address]  [place] [address]   [phone]            Aug 23, 2017  9:30 AM   Est Chemo 3 with RN 5   Infusion Services ([facility])    [address] [facility] [address]  [facility] [address]   [phone]              Problem List              ICD-10-CM Priority Class Noted - Resolved    Metastasis from gastric cancer (CMS-HCC) C79.9, C16.9   5/19/2017 - Present      Health Maintenance        Date Due Completion Dates    IMM DTaP/Tdap/Td Vaccine (1 - Tdap) 12/11/1987 ---    PAP SMEAR 12/11/1989 ---    MAMMOGRAM 12/11/2008 ---            Current Immunizations     No immunizations on file.      Below and/or attached are the medications your provider expects you to take. Review all of your home medications and newly ordered medications with your provider and/or pharmacist. Follow medication instructions as directed by your provider and/or pharmacist. Please keep your medication list with you and share with your provider. Update the information when medications are discontinued, doses are changed, or new medications (including over-the-counter products) are added; and carry medication information at all times in the event of emergency situations     Allergies:  No Known Allergies          Medications  Valid as of: June 27, 2017 -  11:32 AM    Generic Name Brand Name Tablet Size Instructions for use    Folic Acid   Take  by mouth.        Hydrocodone-Acetaminophen (Tab) VICODIN 5-500 MG Take 1-2 Tabs by mouth every four hours as needed for 20 doses.        LORazepam (Tab) ATIVAN 0.5 MG Take 1 Tab by mouth every 8 hours as needed for Anxiety.        Ondansetron HCl (Tab) ZOFRAN 4 MG Take 1 Tab by mouth every four hours as needed for Nausea/Vomiting.        Prochlorperazine Maleate (Tab) COMPAZINE 10 MG Take 1 Tab by mouth every 6 hours as needed.        .                 Medicines prescribed today were sent to:     [id number] [place] [place] - [address]    [address] [place] [address]    Phone: [phone] Fax: [phone]    Open 24 Hours?: No      Medication refill instructions:       If your prescription bottle indicates you have medication refills left, it is not necessary to call your provider’s office. Please contact your pharmacy and they will refill your medication.    If your prescription bottle indicates you do not have any refills left, you may request refills at any time through one of the following ways: The online [employer] system (except Urgent Care), by calling your provider’s office, or by asking your pharmacy to contact your provider’s office with a refill request. Medication refills are processed only during regular business hours and may not be available until the next business day. Your provider may request additional information or to have a follow-up visit with you prior to refilling your medication.   *Please Note: Medication refills are assigned a new Rx number when refilled electronically. Your pharmacy may indicate that no refills were authorized even though a new prescription for the same medication is available at the pharmacy. Please request the medicine by name with the pharmacy before contacting your provider for a refill.           MyChart Status: Patient Declined

## 2017-06-27 NOTE — NON-PROVIDER
Melita Herrera is a 46 y.o. female here for a non-provider visit for: Lab Draws  on 6/27/2017 at 11:30 AM    Procedure Performed: Venipuncture     Anatomical site: Right Antecubital Area (AC)    Equipment used: 21G needle     Labs drawn: , CBC w/diff and CMP    Ordering Provider: JUSTIN Willson    Bang By: Jordan Bowie Ass't

## 2017-06-28 ENCOUNTER — OUTPATIENT INFUSION SERVICES (OUTPATIENT)
Dept: ONCOLOGY | Facility: MEDICAL CENTER | Age: 47
End: 2017-06-28
Attending: INTERNAL MEDICINE
Payer: MEDICAID

## 2017-06-28 VITALS
WEIGHT: 121.25 LBS | HEIGHT: 59 IN | TEMPERATURE: 99 F | RESPIRATION RATE: 18 BRPM | HEART RATE: 72 BPM | DIASTOLIC BLOOD PRESSURE: 67 MMHG | SYSTOLIC BLOOD PRESSURE: 123 MMHG | BODY MASS INDEX: 24.44 KG/M2 | OXYGEN SATURATION: 100 %

## 2017-06-28 DIAGNOSIS — C16.9 METASTASIS FROM GASTRIC CANCER (HCC): ICD-10-CM

## 2017-06-28 DIAGNOSIS — C79.9 METASTASIS FROM GASTRIC CANCER (HCC): ICD-10-CM

## 2017-06-28 LAB
ALBUMIN SERPL BCP-MCNC: 3.4 G/DL (ref 3.2–4.9)
ALBUMIN/GLOB SERPL: 1.3 G/DL
ALP SERPL-CCNC: 121 U/L (ref 30–99)
ALT SERPL-CCNC: 75 U/L (ref 2–50)
ANION GAP SERPL CALC-SCNC: 7 MMOL/L (ref 0–11.9)
APPEARANCE UR: ABNORMAL
AST SERPL-CCNC: 37 U/L (ref 12–45)
BACTERIA #/AREA URNS HPF: ABNORMAL /HPF
BASOPHILS # BLD AUTO: 0.8 % (ref 0–1.8)
BASOPHILS # BLD: 0.02 K/UL (ref 0–0.12)
BILIRUB SERPL-MCNC: 0.3 MG/DL (ref 0.1–1.5)
BILIRUB UR QL STRIP.AUTO: NEGATIVE
BUN SERPL-MCNC: 17 MG/DL (ref 8–22)
CALCIUM SERPL-MCNC: 8.7 MG/DL (ref 8.5–10.5)
CHLORIDE SERPL-SCNC: 109 MMOL/L (ref 96–112)
CO2 SERPL-SCNC: 23 MMOL/L (ref 20–33)
COLOR UR: YELLOW
CREAT SERPL-MCNC: 0.68 MG/DL (ref 0.5–1.4)
EOSINOPHIL # BLD AUTO: 0.07 K/UL (ref 0–0.51)
EOSINOPHIL NFR BLD: 2.9 % (ref 0–6.9)
EPI CELLS #/AREA URNS HPF: ABNORMAL /HPF
ERYTHROCYTE [DISTWIDTH] IN BLOOD BY AUTOMATED COUNT: 53.8 FL (ref 35.9–50)
GFR SERPL CREATININE-BSD FRML MDRD: >60 ML/MIN/1.73 M 2
GLOBULIN SER CALC-MCNC: 2.7 G/DL (ref 1.9–3.5)
GLUCOSE SERPL-MCNC: 93 MG/DL (ref 65–99)
GLUCOSE UR STRIP.AUTO-MCNC: NEGATIVE MG/DL
HCG UR QL: NEGATIVE
HCT VFR BLD AUTO: 35 % (ref 37–47)
HGB BLD-MCNC: 10.8 G/DL (ref 12–16)
IMM GRANULOCYTES # BLD AUTO: 0.01 K/UL (ref 0–0.11)
IMM GRANULOCYTES NFR BLD AUTO: 0.4 % (ref 0–0.9)
KETONES UR STRIP.AUTO-MCNC: NEGATIVE MG/DL
LEUKOCYTE ESTERASE UR QL STRIP.AUTO: ABNORMAL
LYMPHOCYTES # BLD AUTO: 0.71 K/UL (ref 1–4.8)
LYMPHOCYTES NFR BLD: 29.7 % (ref 22–41)
MCH RBC QN AUTO: 26.9 PG (ref 27–33)
MCHC RBC AUTO-ENTMCNC: 30.9 G/DL (ref 33.6–35)
MCV RBC AUTO: 87.1 FL (ref 81.4–97.8)
MICRO URNS: ABNORMAL
MONOCYTES # BLD AUTO: 0.16 K/UL (ref 0–0.85)
MONOCYTES NFR BLD AUTO: 6.7 % (ref 0–13.4)
MUCOUS THREADS #/AREA URNS HPF: ABNORMAL /HPF
NEUTROPHILS # BLD AUTO: 1.42 K/UL (ref 2–7.15)
NEUTROPHILS NFR BLD: 59.5 % (ref 44–72)
NITRITE UR QL STRIP.AUTO: NEGATIVE
NRBC # BLD AUTO: 0 K/UL
NRBC BLD AUTO-RTO: 0 /100 WBC
PH UR STRIP.AUTO: 5.5 [PH]
PLATELET # BLD AUTO: 247 K/UL (ref 164–446)
PMV BLD AUTO: 9.8 FL (ref 9–12.9)
POTASSIUM SERPL-SCNC: 4.2 MMOL/L (ref 3.6–5.5)
PROT SERPL-MCNC: 6.1 G/DL (ref 6–8.2)
PROT UR QL STRIP: NEGATIVE MG/DL
RBC # BLD AUTO: 4.02 M/UL (ref 4.2–5.4)
RBC UR QL AUTO: NEGATIVE
SODIUM SERPL-SCNC: 139 MMOL/L (ref 135–145)
SP GR UR REFRACTOMETRY: 1.02
SP GR UR STRIP.AUTO: 1.02
TSH SERPL DL<=0.005 MIU/L-ACNC: 0.65 UIU/ML (ref 0.3–3.7)
WBC # BLD AUTO: 2.4 K/UL (ref 4.8–10.8)
WBC #/AREA URNS HPF: ABNORMAL /HPF

## 2017-06-28 PROCEDURE — A9270 NON-COVERED ITEM OR SERVICE: HCPCS | Performed by: INTERNAL MEDICINE

## 2017-06-28 PROCEDURE — 84443 ASSAY THYROID STIM HORMONE: CPT

## 2017-06-28 PROCEDURE — A4212 NON CORING NEEDLE OR STYLET: HCPCS

## 2017-06-28 PROCEDURE — 80053 COMPREHEN METABOLIC PANEL: CPT

## 2017-06-28 PROCEDURE — 700105 HCHG RX REV CODE 258: Performed by: NURSE PRACTITIONER

## 2017-06-28 PROCEDURE — 96417 CHEMO IV INFUS EACH ADDL SEQ: CPT

## 2017-06-28 PROCEDURE — 96413 CHEMO IV INFUSION 1 HR: CPT

## 2017-06-28 PROCEDURE — 85025 COMPLETE CBC W/AUTO DIFF WBC: CPT

## 2017-06-28 PROCEDURE — 700111 HCHG RX REV CODE 636 W/ 250 OVERRIDE (IP): Performed by: INTERNAL MEDICINE

## 2017-06-28 PROCEDURE — 304540 HCHG NITRO SET VENT 2ND TUB

## 2017-06-28 PROCEDURE — 96375 TX/PRO/DX INJ NEW DRUG ADDON: CPT

## 2017-06-28 PROCEDURE — 700101 HCHG RX REV CODE 250

## 2017-06-28 PROCEDURE — 81001 URINALYSIS AUTO W/SCOPE: CPT

## 2017-06-28 PROCEDURE — 700111 HCHG RX REV CODE 636 W/ 250 OVERRIDE (IP): Performed by: NURSE PRACTITIONER

## 2017-06-28 PROCEDURE — 700102 HCHG RX REV CODE 250 W/ 637 OVERRIDE(OP): Performed by: INTERNAL MEDICINE

## 2017-06-28 PROCEDURE — 81025 URINE PREGNANCY TEST: CPT

## 2017-06-28 PROCEDURE — 700111 HCHG RX REV CODE 636 W/ 250 OVERRIDE (IP)

## 2017-06-28 RX ORDER — ACETAMINOPHEN 325 MG/1
650 TABLET ORAL ONCE
Status: COMPLETED | OUTPATIENT
Start: 2017-06-28 | End: 2017-06-28

## 2017-06-28 RX ORDER — LIDOCAINE HYDROCHLORIDE 10 MG/ML
INJECTION, SOLUTION INFILTRATION; PERINEURAL
Status: COMPLETED
Start: 2017-06-28 | End: 2017-06-28

## 2017-06-28 RX ORDER — LORAZEPAM 1 MG/1
0.5 TABLET ORAL ONCE
Status: COMPLETED | OUTPATIENT
Start: 2017-06-28 | End: 2017-06-28

## 2017-06-28 RX ADMIN — ACETAMINOPHEN 650 MG: 325 TABLET, FILM COATED ORAL at 14:20

## 2017-06-28 RX ADMIN — DEXAMETHASONE SODIUM PHOSPHATE 12 MG: 4 INJECTION, SOLUTION INTRAMUSCULAR; INTRAVENOUS at 14:55

## 2017-06-28 RX ADMIN — LIDOCAINE HYDROCHLORIDE: 10 INJECTION, SOLUTION INFILTRATION; PERINEURAL at 11:05

## 2017-06-28 RX ADMIN — FAMOTIDINE 20 MG: 10 INJECTION INTRAVENOUS at 14:20

## 2017-06-28 RX ADMIN — HEPARIN 500 UNITS: 100 SYRINGE at 18:08

## 2017-06-28 RX ADMIN — DIPHENHYDRAMINE HYDROCHLORIDE 50 MG: 50 INJECTION INTRAMUSCULAR; INTRAVENOUS at 14:20

## 2017-06-28 RX ADMIN — LORAZEPAM 0.5 MG: 1 TABLET ORAL at 14:54

## 2017-06-28 RX ADMIN — PACLITAXEL 120 MG: 6 INJECTION, SOLUTION, CONCENTRATE INTRAVENOUS at 16:52

## 2017-06-28 RX ADMIN — SODIUM CHLORIDE: 9 INJECTION, SOLUTION INTRAVENOUS at 15:36

## 2017-06-28 RX ADMIN — ONDANSETRON HYDROCHLORIDE 16 MG: 2 SOLUTION INTRAMUSCULAR; INTRAVENOUS at 14:32

## 2017-06-28 ASSESSMENT — PAIN SCALES - GENERAL: PAINLEVEL: NO PAIN

## 2017-06-28 NOTE — PROGRESS NOTES
Chemotherapy Verification - SECONDARY RN       Height = 150 cm  Weight = 55 kg  BSA = 1.51 m2       Medication: Taxol  Dose: 80 mg/m2  Calculated Dose: 120.8 mg                             (In mg/m2, AUC, mg/kg)     Medication: Ramucirumab  Dose: 8 mg/kg  Calculated Dose: 440 mg                             (In mg/m2, AUC, mg/kg)      I confirm that this process was performed independently.

## 2017-06-28 NOTE — PROGRESS NOTES
"Pharmacy Chemotherapy Verification    Patient Name: Melita Herrera  Dx: Metastatic gastric carcinoma    Protocol: Ramucirumab + Paclitaxel    Ramucirumab 8 mg/kg IV on days 1 and 15  Paclitaxel 80 mg/m2 IV on days 1, 8, and 15  28 day cycle  Lyssa H, Raymundo K, Marcus Barraza E, et al; [place] Study Group. Ramucirumab plus paclitaxel versus placebo plus paclitaxel in patients with previously treated advanced gastric or gastro-oesophageal junction adenocarcinoma ([place]): a double-blind, randomised phase 3 trial. Lancet Oncol. 2014;15(11):0389-9869.    Allergies: Review of patient's allergies indicates no known allergies.  /67 mmHg  Pulse 72  Temp(Src) 37.2 °C (99 °F)  Resp 18  Ht 1.5 m (4' 11.06\")  Wt 55 kg (121 lb 4.1 oz)  BMI 24.44 kg/m2  SpO2 100%  Body surface area is 1.51 meters squared.    Labs 6/28/17  ANC ~1420 Plt = 247k   Hgb = 10.8    SCr = 0.68 mg/dL CrCl ~85 mL/min   AST/ALT/AP = 37/75/121 TBili = 0.3    Urine protein = negative  Urine HCG = negative    Labs 6/28/17  TSH = 0.65       Drug Order   (Drug name, dose, route, IV Fluid & volume, frequency, number of doses) Cycle: 1 Day 15  Previous treatment: C1D8 6/21/17     Medication = Ramucirumab  Base Dose= 8 mg/kg  Calc Dose: Base Dose x 55 kg = 436.8 mg  Final Dose = 440 mg  Route = IV  Fluid & Volume =  mL  Admin Duration = Over 60 minutes   Administer first          <5% difference, OK to treat with final dose      Medication = PACLItaxel  Base Dose= 80 mg/m2  Calc Dose: Base Dose x 1.51 m2 = 120.8 mg  Final Dose = 120 mg  Route = 120 mg  Fluid & Volume =  mL  Admin Duration = Over 60 minutes             <5% difference, OK to treat with final dose          By my signature below, I confirm this process was performed independently with the BSA and all final chemotherapy dosing calculations congruent. I have reviewed the above chemotherapy order and that my calculation of the final dose and BSA (when applicable) " corroborate those calculations of the  pharmacist. Discrepancies of 5% or greater in the written dose have been addressed and documented within the EPIC Progress notes.    Zak SchmidtD

## 2017-06-28 NOTE — PROGRESS NOTES
Chemotherapy Verification - PRIMARY RN      Height = 150 cm  Weight = 55 kg  BSA = 1.51 m2       Medication: Ramucirumab (Cyramza)  Dose: 8 mg/kg  Calculated Dose: 440 mg                                  Medication: Taxol  Dose: 80 mg/m2  Calculated Dose: 120.8 mg                                I confirm this process was performed independently with the BSA and all final chemotherapy dosing calculations congruent.  Any discrepancies of 5% or greater have been addressed with the chemotherapy pharmacist. The resolution of the discrepancy has been documented in the EPIC progress notes.

## 2017-06-28 NOTE — PROGRESS NOTES
"Pharmacy Chemotherapy Calculations Note:    Patient Name: Melita Herrera     Dx: metastatic gastric cancer    Cycle: 1 Day 15   Previous treatment:C1D8 6/21/17     Protocol: Cyramza + Taxol  Ramucirumab (Cyramza) 8 mg/kg IV on Days 1 and 15  Paclitaxel (Taxol) 80 mg/m2 IV on Days 1, 8, and 15  28-day cycle    Lyssa JOYA, et al. Ramucirumab plus paclitaxel versus placebo plus paclitaxel in patients with previously treated advanced gastric or gastro-oesophageal junction adenocarcinoma ([place]): a double-blind, randomised phase 3 trial. Lancet Oncol. 2014 Oct;15(11):1224-35.        /67 mmHg  Pulse 72  Temp(Src) 37.2 °C (99 °F)  Resp 18  Ht 1.5 m (4' 11.06\")  Wt 55 kg (121 lb 4.1 oz)  BMI 24.44 kg/m2  SpO2 100% Body surface area is 1.51 meters squared.    ANC~ 1420 Plt = 247k   Hgb = 10.8     SCr = 0.68mg/dL CrCl ~ 85mL/min (min Scr = 0.7)   LFT's = 37/75/121 TBili = 0.3  TSH = 0.65  Urine protein= negative   Urine pregnancy(beta Hcg) = negative     Ramucirumab (Cyramza) 8 mg/kg x 55kg = 440mg   <5% difference, okay to treat with final written dose = 440 mg IV    Paclitaxel (Taxol) 80 mg/m² x 1.51 m² = 120.8 mg   <5% difference, okay to treat with final written dose = 120 mg IV      RAQUEL Duarte Pharm.D.               "

## 2017-06-29 NOTE — PROGRESS NOTES
"Pt arrived to [facility] amb[facility], here for D15C1 Taxol/Ramucirumab for gastric cancer. Pt German speaking only,  line utilized throughout today's appt. Pt with R chest port, requesting lidocaine to numb site. Port site numbed and accessed in sterile field. Labs drawn as ordered. Pt also provided UA per orders. Results reviewed and within parameters for chemo to begin. UA results unable to be visualized in lab flow sheets. Call placed to lab and per , \"we messed up, it'll be another 10 min\". UA results available and reviewed. Pharmacy attempted to enter chemotherapy orders into EPIC however alert warned pt may be pregnant. In review of labs from earlier in the month, bHCG was 0.9. Nayeli RN utilized translation phone and dicussed with pt need to get urine pregnancy test. Pt reported to Nayeli that she and her boyfriend would like to have a child together. Nayeli educated pt that while receiving chemo, it may not be be the best time nor conducive to a healthy pregnancy. Call placed to Dr. Medrano's office and discussed with Cheyenne need for pregnancy test. Cheyenne discussed with Joanna Aguilera and orders received to perform urine pregnancy test. Pregnancy test results negative. Pt informed of test results. Premeds given. Pt c/o \"anxiety\" requesting Ativan. Orders in place for one time po ativan. Ativan given and pt confirmed positive result with less anxiety. Chemo infused as prescribed; Ramucirumab infused first as instructed. Pt slept t/o both infusions. Line flushed clear and port flushed. Heparin instilled and needle de-accessed intact. Gauze dressing applied. Utilized [employer] to translate - informed pt of next appt and discussed with pt that her anxiety may be connected to the IV benadryl. Call placed to Cheyenne to request for next cycle po benadryl instead. Pt verbalized understanding. Pt denied any further questions. Discharged home under self care in no apparent distress. Knows to return 7/12.  "

## 2017-07-05 ENCOUNTER — DOCUMENTATION (OUTPATIENT)
Dept: HEMATOLOGY ONCOLOGY | Facility: MEDICAL CENTER | Age: 47
End: 2017-07-05

## 2017-07-05 NOTE — PROGRESS NOTES
Patient came to medical oncology  and asked whether or not it is ok for her to get laser facial done.  Per JUSTIN Willson informed pt that she can not have laser facial due to risk for infection and skin sensitivity caused by chemotherapy. Patient verbalized understanding.

## 2017-07-10 DIAGNOSIS — C79.9 METASTASIS FROM GASTRIC CANCER (HCC): ICD-10-CM

## 2017-07-10 DIAGNOSIS — C16.9 METASTASIS FROM GASTRIC CANCER (HCC): ICD-10-CM

## 2017-07-11 ENCOUNTER — NON-PROVIDER VISIT (OUTPATIENT)
Dept: HEMATOLOGY ONCOLOGY | Facility: MEDICAL CENTER | Age: 47
End: 2017-07-11
Payer: MEDICAID

## 2017-07-11 ENCOUNTER — OFFICE VISIT (OUTPATIENT)
Dept: HEMATOLOGY ONCOLOGY | Facility: MEDICAL CENTER | Age: 47
End: 2017-07-11
Payer: MEDICAID

## 2017-07-11 VITALS
HEIGHT: 59 IN | WEIGHT: 122 LBS | RESPIRATION RATE: 16 BRPM | OXYGEN SATURATION: 97 % | BODY MASS INDEX: 24.6 KG/M2 | SYSTOLIC BLOOD PRESSURE: 120 MMHG | DIASTOLIC BLOOD PRESSURE: 64 MMHG | TEMPERATURE: 98.6 F | HEART RATE: 64 BPM

## 2017-07-11 VITALS
HEART RATE: 64 BPM | BODY MASS INDEX: 24.6 KG/M2 | HEIGHT: 59 IN | TEMPERATURE: 98.6 F | OXYGEN SATURATION: 97 % | SYSTOLIC BLOOD PRESSURE: 120 MMHG | DIASTOLIC BLOOD PRESSURE: 64 MMHG | WEIGHT: 122.02 LBS | RESPIRATION RATE: 16 BRPM

## 2017-07-11 DIAGNOSIS — C16.9 METASTASIS FROM GASTRIC CANCER (HCC): ICD-10-CM

## 2017-07-11 DIAGNOSIS — C79.9 METASTASIS FROM GASTRIC CANCER (HCC): ICD-10-CM

## 2017-07-11 PROCEDURE — 99213 OFFICE O/P EST LOW 20 MIN: CPT | Performed by: NURSE PRACTITIONER

## 2017-07-11 PROCEDURE — 36415 COLL VENOUS BLD VENIPUNCTURE: CPT | Performed by: NURSE PRACTITIONER

## 2017-07-11 ASSESSMENT — ENCOUNTER SYMPTOMS
CONSTIPATION: 0
MYALGIAS: 0
FEVER: 0
ABDOMINAL PAIN: 1
CHILLS: 0
TINGLING: 0
SHORTNESS OF BREATH: 0
COUGH: 0
VOMITING: 0
PALPITATIONS: 0
WHEEZING: 0
HEADACHES: 0
DIARRHEA: 0
NAUSEA: 0
WEIGHT LOSS: 0
DIZZINESS: 0

## 2017-07-11 ASSESSMENT — PAIN SCALES - GENERAL
PAINLEVEL: 6=MODERATE PAIN
PAINLEVEL: 6=MODERATE PAIN

## 2017-07-11 NOTE — MR AVS SNAPSHOT
Melita Herrera   2017 10:00 AM   Appointment   MRN: [id number]    Department:  [facility]   Dept Phone:  [phone]    Description:  Female : 1970   Provider:  ONC MA 1           Allergies as of 2017     Not on File      Vital Signs     Smoking Status                   Never Smoker            Basic Information     Date Of Birth Sex Race Ethnicity Preferred Language    1970 Female  or   Origin (Taiwanese,French,Brazilian,Jose, etc) English      Your appointments     2017 11:00 AM   ONCOLOGY EST PATIENT 30 MIN with LEO Jonas   [facility] (--)    [address], [address]  [facility] [address] [phone]            2017  8:30 AM   Est Chemo 3 with RN 3   Infusion Services ([facility])    [address] [facility] [address]  [place] [address] [phone]            2017  8:30 AM   Est Chemo 3 with RN 4   Infusion Services ([facility])    [address] [facility] [address]  [place] [address] [phone]            2017 11:30 AM   Est Chemo 3 with RN 4   Infusion Services ([facility])    [address] [facility] [address]  [place] [address] [phone]            Aug 08, 2017  8:30 AM   Non Provider 1 with ONC MA Tigist   [facility] (--)    [address], [address]  [facility] [address] [phone]           You will be receiving a confirmation call a few days before your appointment from our automated call confirmation system.            Aug 08, 2017  9:40 AM   ONCOLOGY EST PATIENT 30 MIN with Ramana Medrano M.D.   [facility] (--)    [address], [address]  [facility] [address] [phone]            Aug 09, 2017  9:30 AM   Est Chemo 3 with RN 5   Infusion Services ([facility])    [address] [facility] [address]  [place] [address] [phone]            Aug 16, 2017  9:30 AM   Est Chemo 3 with RN 5   Infusion Services ([facility])    [address] [facility] [address]  [place] [address] [phone]            Aug 23, 2017  9:30 AM   Est Chemo 3 with RN 5   Infusion Services ([facility])    [address] [facility] [address]  [place] [address] [phone]              Problem List              ICD-10-CM Priority Class Noted - Resolved    Metastasis from gastric cancer (CMS-HCC) C79.9, C16.9   5/19/2017 - Present      Health Maintenance        Date Due Completion Dates    IMM DTaP/Tdap/Td Vaccine (1 - Tdap) 12/11/1987 ---    PAP SMEAR 12/11/1989 ---    MAMMOGRAM 12/11/2008 ---    IMM INFLUENZA (1) 9/1/2017 ---            Current Immunizations     No immunizations on file.      Below and/or attached are the medications your provider expects you to take. Review all of your home medications and newly ordered medications with your provider and/or pharmacist. Follow medication instructions as directed by your provider and/or pharmacist. Please keep your medication list with you and share with your provider. Update the information when medications are discontinued, doses are changed, or new medications (including over-the-counter products) are added; and carry medication information at all times in the event of emergency situations     Allergies:  No Known Allergies          Medications  Valid as of: July 11, 2017 - 10:15 AM    Generic Name Brand Name Tablet Size Instructions for use    Folic Acid   Take  by mouth.        Hydrocodone-Acetaminophen (Tab) VICODIN 5-500 MG Take 1-2 Tabs by mouth every four hours as needed for 20 doses.        LORazepam (Tab) ATIVAN 0.5 MG Take 1 Tab by mouth every 8 hours as needed for Anxiety.        Ondansetron HCl (Tab) ZOFRAN 4 MG Take 1 Tab by mouth every four hours as needed for Nausea/Vomiting.        Prochlorperazine Maleate (Tab) COMPAZINE 10 MG Take 1 Tab by mouth every 6 hours as needed.        .                 Medicines prescribed today were sent to:     [id number] - [place] - [address]    [address] [place] [address]    Phone: [phone] Fax: [phone]    Open 24  Hours?: No      Medication refill instructions:       If your prescription bottle indicates you have medication refills left, it is not necessary to call your provider’s office. Please contact your pharmacy and they will refill your medication.    If your prescription bottle indicates you do not have any refills left, you may request refills at any time through one of the following ways: The online [employer] system (except Urgent Care), by calling your provider’s office, or by asking your pharmacy to contact your provider’s office with a refill request. Medication refills are processed only during regular business hours and may not be available until the next business day. Your provider may request additional information or to have a follow-up visit with you prior to refilling your medication.   *Please Note: Medication refills are assigned a new Rx number when refilled electronically. Your pharmacy may indicate that no refills were authorized even though a new prescription for the same medication is available at the pharmacy. Please request the medicine by name with the pharmacy before contacting your provider for a refill.           [employer] Status: Patient Declined

## 2017-07-11 NOTE — PROGRESS NOTES
Subjective:      Melita Herrera is a 46 y.o. female who presents for Follow-Up for gastric cancer.         HPI    Patient seen today in follow up for gastric cancer. She is accompanied by herself for today's appointment.  is utilized today for communication. Patient is currently on cycle 2, day 1 of Cyramza and paclitaxel.    Patient tolerated her first cycle fairly well. She denies any fevers, chills or weight loss. She is actually had stable weight. She does have some mild fatigue at times. She denies coughing, wheezing, shortness of breath. She denies any chest pain, heart palpitations or swelling in her legs. She also stated that she has persistent left breast pain that feels like a pinching pain at times. This is intermittent and she believes it's due to previous surgeries. She does have some abdominal pain which is persistent with pain medication. She states she is only taking about a half a pill once a day for the pain medication. She said sometimes the pain radiates to her shoulders as well. She denies any nausea, vomiting, diarrhea or constipation. Her last prominent was yesterday and she states she does have a bowel movement every day. She is voiding without difficulty. She denies pain anywhere else. She denies any rashes or itching. She denies dizziness, headaches and peripheral neuropathy.     Patient is requesting to use falso eyelashes. She is also requesting to see if she can go back to work while undergoing chemotherapy.    No Known Allergies  Current Outpatient Prescriptions on File Prior to Visit   Medication Sig Dispense Refill   • FOLIC ACID PO Take  by mouth.     • ondansetron (ZOFRAN) 4 MG Tab tablet Take 1 Tab by mouth every four hours as needed for Nausea/Vomiting. 30 Tab 3   • prochlorperazine (COMPAZINE) 10 MG Tab Take 1 Tab by mouth every 6 hours as needed. 30 Tab 3   • lorazepam (ATIVAN) 0.5 MG Tab Take 1 Tab by mouth every 8 hours as needed for Anxiety. 30  "Tab 1   • hydrocodone-acetaminophen (VICODIN) 5-500 MG TABS Take 1-2 Tabs by mouth every four hours as needed for 20 doses. 20 Each 0     No current facility-administered medications on file prior to visit.       Review of Systems   Constitutional: Positive for malaise/fatigue (mild fatigue). Negative for fever, chills and weight loss.   Respiratory: Negative for cough, shortness of breath and wheezing.    Cardiovascular: Negative for chest pain, palpitations and leg swelling.        Left breast pain that feels like a pinching pain. She believes this may have been d/t previous surgeries. This happens intermittently.   Gastrointestinal: Positive for abdominal pain (she does have the persistent pain and it relieves with pain medication). Negative for nausea, vomiting, diarrhea and constipation.   Genitourinary: Negative for dysuria.   Musculoskeletal: Negative for myalgias.   Skin: Negative for itching and rash.   Neurological: Negative for dizziness, tingling and headaches.          Objective:     /64 mmHg  Pulse 64  Temp(Src) 37 °C (98.6 °F)  Resp 16  Ht 1.499 m (4' 11\")  Wt 55.35 kg (122 lb 0.4 oz)  BMI 24.63 kg/m2  SpO2 97%     Physical Exam   Constitutional: She is oriented to person, place, and time. She appears well-developed and well-nourished. No distress.   HENT:   Head: Normocephalic and atraumatic.   Mouth/Throat: Oropharynx is clear and moist. No oropharyngeal exudate.   Eyes: Conjunctivae and EOM are normal. Pupils are equal, round, and reactive to light. Right eye exhibits no discharge. Left eye exhibits no discharge. No scleral icterus.   Cardiovascular: Normal rate, regular rhythm, normal heart sounds and intact distal pulses.  Exam reveals no gallop and no friction rub.    No murmur heard.  Pulmonary/Chest: Effort normal and breath sounds normal. No respiratory distress. She has no wheezes.   Abdominal: Soft. Bowel sounds are normal. She exhibits no distension. There is tenderness (upper " left, epigastric and right tenderness ).   Musculoskeletal: Normal range of motion. She exhibits no edema or tenderness.   Neurological: She is alert and oriented to person, place, and time.   Skin: Skin is warm and dry. No rash noted. She is not diaphoretic. No erythema. No pallor.   Psychiatric: She has a normal mood and affect. Her behavior is normal.   Vitals reviewed.              Assessment/Plan:     1. Metastasis from gastric cancer (CMS-HCC)  CT-CHEST,ABDOMEN,PELVIS WITH     Plan  1. Patient tolerated her first treatment fairly well. She is currently scheduled to receive cycle 2, day 1 of Cyramza and paclitaxel tomorrow. She did have her labs drawn today but unfortunately the results are not back yet. If labs meet parameters okay to proceed with treatment as planned.    The infusion nurse reached out to us after last cycle requesting we do a pregnancy test with every cycle of chemotherapy. She has had multiple conversations with myself as well as the nurses that she needs to be careful and use protection if she is undergoing sexual intercourse. She is to not get pregnant while on treatment. We will write an order for a quick urine pregnancy test prior to chemotherapies. Also, according to the infusion nurse patient does have what appears to be restless leg during the treatment which may have been caused by the IV Benadryl. We will change the Benadryl to oral to see if that will resolve the restless leg she experienced with the last cycle.    2. At the request of Dr. Medrano he would like to repeat CT scan after 2 full cycles of treatment. I have ordered a CT scan of the chest, abdomen, and pelvis with contrast and we'll get that scheduled for her on Monday, August 7. She is due to follow-up with Dr. Medrano on Tuesday, August 8 prior to her third cycle of chemotherapy.    3. I did discuss with patient that I would prefer she not use false eyelashes due to the glue that can harm her skin. Verbalizes  understanding and is in agreement to stop using false eyelashes.    4. I did discuss with Dr. Medrano and he is okay for patient to work if she is feeling well enough. 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 Patient to visit her body and take it easy if she starts to feel to fatigue well on treatment.    5. Patient to follow up again in 4 weeks or sooner if needed.

## 2017-07-11 NOTE — MR AVS SNAPSHOT
"        Melita Herrera   2017 11:00 AM   Office Visit   MRN: [id number]    Department:  [facility]   Dept Phone:  [phone]    Description:  Female : 1970   Provider:  NASIM JonasPKITTY.           Reason for Visit     Follow-Up           Allergies as of 2017     No Known Allergies      You were diagnosed with     Metastasis from gastric cancer (CMS-HCC)   [[id number]]         Vital Signs     Blood Pressure Pulse Temperature Respirations Height Weight    120/64 mmHg 64 37 °C (98.6 °F) 16 1.499 m (4' 11\") 55.35 kg (122 lb 0.4 oz)    Body Mass Index Oxygen Saturation Smoking Status             24.63 kg/m2 97% Never Smoker          Basic Information     Date Of Birth Sex Race Ethnicity Preferred Language    1970 Female  or   Origin (Swedish,Icelandic,Sudanese,Latvian, etc) English      Your appointments     2017  8:30 AM   Est Chemo 3 with RN 3   [facility] ([facility])    [address]  [facility] [address]   [phone]            2017  8:30 AM   Est Chemo 3 with RN 4   [facility] ([facility])    [address]  [place] [address] [phone]            2017 11:30 AM   Est Chemo 3 with RN 4   [facility] ([facility])    [address]  [place] [id number] [phone]            Aug 08, 2017  8:30 AM   Non Provider 1 with ONC MA 1   [facility] (--)    [address] [facility] [address]  [facility] [id number] [phone]           You will be receiving a confirmation call a few days before your appointment from our automated call confirmation system.            Aug 08, 2017  9:40 AM   ONCOLOGY EST PATIENT 30 MIN with Ramana Medrano M.D.   [facility] (--)    [address] [facility] [address]  [facility] [id number] [phone]            Aug 09, 2017  9:30 AM   Est Chemo 3 with RN 5   [facility] ([facility])    [address]  [facility] [id number] [phone]   "            Aug 16, 2017  9:30 AM   Est Chemo 3 with RN 5   [facility] ([facility])    [address] [facility] [address]  [place] [id number] [phone]            Aug 23, 2017  9:30 AM   Est Chemo 3 with RN 5   [facility] ([facility])    [address] [facility] [address]  [place] [address]   [phone]              Problem List              ICD-10-CM Priority Class Noted - Resolved    Metastasis from gastric cancer (CMS-HCC) C79.9, C16.9   5/19/2017 - Present      Health Maintenance        Date Due Completion Dates    IMM DTaP/Tdap/Td Vaccine (1 - Tdap) 12/11/1987 ---    PAP SMEAR 12/11/1989 ---    MAMMOGRAM 12/11/2008 ---    IMM INFLUENZA (1) 9/1/2017 ---            Current Immunizations     No immunizations on file.      Below and/or attached are the medications your provider expects you to take. Review all of your home medications and newly ordered medications with your provider and/or pharmacist. Follow medication instructions as directed by your provider and/or pharmacist. Please keep your medication list with you and share with your provider. Update the information when medications are discontinued, doses are changed, or new medications (including over-the-counter products) are added; and carry medication information at all times in the event of emergency situations     Allergies:  No Known Allergies          Medications  Valid as of: July 11, 2017 - 11:56 AM    Generic Name Brand Name Tablet Size Instructions for use    Folic Acid   Take  by mouth.        Hydrocodone-Acetaminophen (Tab) VICODIN 5-500 MG Take 1-2 Tabs by mouth every four hours as needed for 20 doses.        LORazepam (Tab) ATIVAN 0.5 MG Take 1 Tab by mouth every 8 hours as needed for Anxiety.        Ondansetron HCl (Tab) ZOFRAN 4 MG Take 1 Tab by mouth every four hours as needed for Nausea/Vomiting.        Prochlorperazine Maleate (Tab) COMPAZINE 10 MG Take 1 Tab by mouth every 6 hours as needed.        .                 Medicines prescribed today  were sent to:     [id number] - [place], [place] - [address]    [address] [place] [address]    Phone: [phone] Fax: [phone]    Open 24 Hours?: No      Medication refill instructions:       If your prescription bottle indicates you have medication refills left, it is not necessary to call your provider’s office. Please contact your pharmacy and they will refill your medication.    If your prescription bottle indicates you do not have any refills left, you may request refills at any time through one of the following ways: The online [facility] system (except Urgent Care), by calling your provider’s office, or by asking your pharmacy to contact your provider’s office with a refill request. Medication refills are processed only during regular business hours and may not be available until the next business day. Your provider may request additional information or to have a follow-up visit with you prior to refilling your medication.   *Please Note: Medication refills are assigned a new Rx number when refilled electronically. Your pharmacy may indicate that no refills were authorized even though a new prescription for the same medication is available at the pharmacy. Please request the medicine by name with the pharmacy before contacting your provider for a refill.        Your To Do List     Future Labs/Procedures Complete By Expires    CT-CHEST,ABDOMEN,PELVIS WITH  As directed 1/8/2018         [facility] Status: Patient Declined

## 2017-07-12 ENCOUNTER — PATIENT OUTREACH (OUTPATIENT)
Dept: HEALTH INFORMATION MANAGEMENT | Facility: OTHER | Age: 47
End: 2017-07-12

## 2017-07-12 ENCOUNTER — OUTPATIENT INFUSION SERVICES (OUTPATIENT)
Dept: ONCOLOGY | Facility: MEDICAL CENTER | Age: 47
End: 2017-07-12
Attending: INTERNAL MEDICINE
Payer: MEDICAID

## 2017-07-12 VITALS
HEIGHT: 59 IN | OXYGEN SATURATION: 95 % | BODY MASS INDEX: 24.31 KG/M2 | TEMPERATURE: 99.1 F | SYSTOLIC BLOOD PRESSURE: 116 MMHG | DIASTOLIC BLOOD PRESSURE: 70 MMHG | WEIGHT: 120.59 LBS | HEART RATE: 80 BPM | RESPIRATION RATE: 18 BRPM

## 2017-07-12 DIAGNOSIS — C79.9 METASTASIS FROM GASTRIC CANCER (HCC): ICD-10-CM

## 2017-07-12 DIAGNOSIS — C16.9 METASTASIS FROM GASTRIC CANCER (HCC): ICD-10-CM

## 2017-07-12 LAB
ALBUMIN SERPL BCP-MCNC: 3.5 G/DL (ref 3.2–4.9)
ALBUMIN/GLOB SERPL: 1.3 G/DL
ALP SERPL-CCNC: 96 U/L (ref 30–99)
ALT SERPL-CCNC: 10 U/L (ref 2–50)
ANION GAP SERPL CALC-SCNC: 11 MMOL/L (ref 0–11.9)
APPEARANCE UR: ABNORMAL
AST SERPL-CCNC: 18 U/L (ref 12–45)
B-HCG SERPL-ACNC: 0.7 MIU/ML (ref 0–5)
BACTERIA #/AREA URNS HPF: ABNORMAL /HPF
BASOPHILS # BLD AUTO: 0.6 % (ref 0–1.8)
BASOPHILS # BLD: 0.02 K/UL (ref 0–0.12)
BILIRUB SERPL-MCNC: 0.3 MG/DL (ref 0.1–1.5)
BILIRUB UR QL STRIP.AUTO: NEGATIVE
BUN SERPL-MCNC: 15 MG/DL (ref 8–22)
CALCIUM SERPL-MCNC: 9 MG/DL (ref 8.5–10.5)
CANCER AG125 SERPL-ACNC: 12.5 U/ML (ref 0–35)
CHLORIDE SERPL-SCNC: 105 MMOL/L (ref 96–112)
CO2 SERPL-SCNC: 23 MMOL/L (ref 20–33)
COLOR UR: YELLOW
CREAT SERPL-MCNC: 0.63 MG/DL (ref 0.5–1.4)
EOSINOPHIL # BLD AUTO: 0.06 K/UL (ref 0–0.51)
EOSINOPHIL NFR BLD: 1.9 % (ref 0–6.9)
EPI CELLS #/AREA URNS HPF: ABNORMAL /HPF
ERYTHROCYTE [DISTWIDTH] IN BLOOD BY AUTOMATED COUNT: 51.8 FL (ref 35.9–50)
GFR SERPL CREATININE-BSD FRML MDRD: >60 ML/MIN/1.73 M 2
GLOBULIN SER CALC-MCNC: 2.8 G/DL (ref 1.9–3.5)
GLUCOSE SERPL-MCNC: 102 MG/DL (ref 65–99)
GLUCOSE UR STRIP.AUTO-MCNC: NEGATIVE MG/DL
HCT VFR BLD AUTO: 37.5 % (ref 37–47)
HGB BLD-MCNC: 11.8 G/DL (ref 12–16)
IMM GRANULOCYTES # BLD AUTO: 0 K/UL (ref 0–0.11)
IMM GRANULOCYTES NFR BLD AUTO: 0 % (ref 0–0.9)
KETONES UR STRIP.AUTO-MCNC: NEGATIVE MG/DL
LEUKOCYTE ESTERASE UR QL STRIP.AUTO: ABNORMAL
LYMPHOCYTES # BLD AUTO: 0.74 K/UL (ref 1–4.8)
LYMPHOCYTES NFR BLD: 23.6 % (ref 22–41)
MCH RBC QN AUTO: 26.9 PG (ref 27–33)
MCHC RBC AUTO-ENTMCNC: 31.5 G/DL (ref 33.6–35)
MCV RBC AUTO: 85.6 FL (ref 81.4–97.8)
MICRO URNS: ABNORMAL
MONOCYTES # BLD AUTO: 0.57 K/UL (ref 0–0.85)
MONOCYTES NFR BLD AUTO: 18.2 % (ref 0–13.4)
MUCOUS THREADS #/AREA URNS HPF: ABNORMAL /HPF
NEUTROPHILS # BLD AUTO: 1.75 K/UL (ref 2–7.15)
NEUTROPHILS NFR BLD: 55.7 % (ref 44–72)
NITRITE UR QL STRIP.AUTO: NEGATIVE
NRBC # BLD AUTO: 0 K/UL
NRBC BLD AUTO-RTO: 0 /100 WBC
PH UR STRIP.AUTO: 5.5 [PH]
PLATELET # BLD AUTO: 270 K/UL (ref 164–446)
PMV BLD AUTO: 10.3 FL (ref 9–12.9)
POTASSIUM SERPL-SCNC: 3.6 MMOL/L (ref 3.6–5.5)
PROT SERPL-MCNC: 6.3 G/DL (ref 6–8.2)
PROT UR QL STRIP: NEGATIVE MG/DL
RBC # BLD AUTO: 4.38 M/UL (ref 4.2–5.4)
RBC # URNS HPF: ABNORMAL /HPF
RBC UR QL AUTO: NEGATIVE
SODIUM SERPL-SCNC: 139 MMOL/L (ref 135–145)
SP GR UR STRIP.AUTO: 1.03
TSH SERPL DL<=0.005 MIU/L-ACNC: 0.55 UIU/ML (ref 0.3–3.7)
UROBILINOGEN UR STRIP.AUTO-MCNC: 1 MG/DL
WBC # BLD AUTO: 3.1 K/UL (ref 4.8–10.8)
WBC #/AREA URNS HPF: ABNORMAL /HPF

## 2017-07-12 PROCEDURE — 304540 HCHG NITRO SET VENT 2ND TUB

## 2017-07-12 PROCEDURE — 700102 HCHG RX REV CODE 250 W/ 637 OVERRIDE(OP): Performed by: INTERNAL MEDICINE

## 2017-07-12 PROCEDURE — 85025 COMPLETE CBC W/AUTO DIFF WBC: CPT

## 2017-07-12 PROCEDURE — 96413 CHEMO IV INFUSION 1 HR: CPT

## 2017-07-12 PROCEDURE — A9270 NON-COVERED ITEM OR SERVICE: HCPCS | Performed by: INTERNAL MEDICINE

## 2017-07-12 PROCEDURE — 700111 HCHG RX REV CODE 636 W/ 250 OVERRIDE (IP): Performed by: INTERNAL MEDICINE

## 2017-07-12 PROCEDURE — 84443 ASSAY THYROID STIM HORMONE: CPT

## 2017-07-12 PROCEDURE — 700111 HCHG RX REV CODE 636 W/ 250 OVERRIDE (IP)

## 2017-07-12 PROCEDURE — 700105 HCHG RX REV CODE 258: Performed by: INTERNAL MEDICINE

## 2017-07-12 PROCEDURE — 96417 CHEMO IV INFUS EACH ADDL SEQ: CPT

## 2017-07-12 PROCEDURE — A4212 NON CORING NEEDLE OR STYLET: HCPCS

## 2017-07-12 PROCEDURE — 306780 HCHG STAT FOR TRANSFUSION PER CASE

## 2017-07-12 PROCEDURE — 81001 URINALYSIS AUTO W/SCOPE: CPT

## 2017-07-12 PROCEDURE — 700101 HCHG RX REV CODE 250

## 2017-07-12 PROCEDURE — 86304 IMMUNOASSAY TUMOR CA 125: CPT

## 2017-07-12 PROCEDURE — 80053 COMPREHEN METABOLIC PANEL: CPT

## 2017-07-12 PROCEDURE — 87086 URINE CULTURE/COLONY COUNT: CPT

## 2017-07-12 PROCEDURE — 96376 TX/PRO/DX INJ SAME DRUG ADON: CPT

## 2017-07-12 PROCEDURE — 84702 CHORIONIC GONADOTROPIN TEST: CPT

## 2017-07-12 RX ORDER — LORAZEPAM 1 MG/1
0.5 TABLET ORAL ONCE
Status: COMPLETED | OUTPATIENT
Start: 2017-07-12 | End: 2017-07-12

## 2017-07-12 RX ORDER — DIPHENHYDRAMINE HCL 25 MG
50 TABLET ORAL ONCE
Status: COMPLETED | OUTPATIENT
Start: 2017-07-12 | End: 2017-07-12

## 2017-07-12 RX ORDER — LIDOCAINE HYDROCHLORIDE 10 MG/ML
INJECTION, SOLUTION INFILTRATION; PERINEURAL
Status: COMPLETED
Start: 2017-07-12 | End: 2017-07-12

## 2017-07-12 RX ORDER — ACETAMINOPHEN 325 MG/1
650 TABLET ORAL ONCE
Status: COMPLETED | OUTPATIENT
Start: 2017-07-12 | End: 2017-07-12

## 2017-07-12 RX ADMIN — LIDOCAINE HYDROCHLORIDE: 10 INJECTION, SOLUTION INFILTRATION; PERINEURAL at 08:48

## 2017-07-12 RX ADMIN — FAMOTIDINE 20 MG: 10 INJECTION, SOLUTION INTRAVENOUS at 10:25

## 2017-07-12 RX ADMIN — SODIUM CHLORIDE: 9 INJECTION, SOLUTION INTRAVENOUS at 11:25

## 2017-07-12 RX ADMIN — DEXAMETHASONE SODIUM PHOSPHATE 12 MG: 4 INJECTION, SOLUTION INTRAMUSCULAR; INTRAVENOUS at 10:20

## 2017-07-12 RX ADMIN — ACETAMINOPHEN 650 MG: 325 TABLET, FILM COATED ORAL at 10:27

## 2017-07-12 RX ADMIN — PACLITAXEL 120 MG: 6 INJECTION, SOLUTION, CONCENTRATE INTRAVENOUS at 12:52

## 2017-07-12 RX ADMIN — DIPHENHYDRAMINE HCL 50 MG: 25 TABLET ORAL at 10:27

## 2017-07-12 RX ADMIN — LORAZEPAM 0.5 MG: 1 TABLET ORAL at 10:41

## 2017-07-12 RX ADMIN — HEPARIN 500 UNITS: 100 SYRINGE at 14:25

## 2017-07-12 ASSESSMENT — PAIN SCALES - GENERAL: PAINLEVEL: NO PAIN

## 2017-07-12 NOTE — PROGRESS NOTES
"Spoke with DAVID Montelongo, who stated that pt has been requesting assistance with food.  Requested that Renny assist with setting up appointment for LICHA, CRUZ, and pt.  Met with pt and CRUZ Angulo, to better assess pt's current needs.  Was informed that pt may not have a good understanding of the importance of contraception during chemotherapy.  Additional education was given to pt regarding the effects of chemotherapy on all body secretions as well as the importance of contraception while receiving chemotherapy.  Rae BEEBE Acted as  during this instruction.  Pt's learning readiness for this conversation was low and will need reinforcement as indicated by patient's statement that she didn't care if her boyfriend was exposed to the chemotherapy.  The lack of learning readiness was reinforced when the patient became even more emotional and stated, \"I don't want to live...\"  At this point in the conversation, CRUZ stopped translating to further assess for suicidal ideation.  The patient indicated that she did not have a plan and did not intend to end her life.  Pt continued to talk about how she was still upset with her boyfriend for cheating on her, and later in the conversation, indicated that her children do not really talk to her.  Asked questions (through CRUZ) about financial concerns.  Pt denied any concerns about finances at this time.  Contact information was given to pt from CRUZ and LICHA (again) along with information about food bansal.  CRUZ explained about the differences in the local food bansal.  Plan to follow up with pt prn.  dsw  "

## 2017-07-12 NOTE — PROGRESS NOTES
Chemotherapy Verification - PRIMARY RN      Height = 150cm  Weight = 54.7kg  BSA = 1.51m2       Medication: Ramucirumab  Dose: 8mg/kg  Calculated Dose: 437.6mg                             (In mg/m2, AUC, mg/kg)     Medication: Paclitaxel  Dose: 80mg/m2  Calculated Dose: 120.8mg                             (In mg/m2, AUC, mg/kg)    I confirm this process was performed independently with the BSA and all final chemotherapy dosing calculations congruent.  Any discrepancies of 5% or greater have been addressed with the chemotherapy pharmacist. The resolution of the discrepancy has been documented in the EPIC progress notes.

## 2017-07-12 NOTE — PROGRESS NOTES
"Pharmacy Chemotherapy Calculations Note:    Patient Name: Melita Herrera     Dx: metastatic gastric cancer    Cycle: 2 Day 1   Previous treatment:C1D15 on 6/28/17     Protocol: Cyramza + Taxol  Ramucirumab (Cyramza) 8 mg/kg IV on Days 1 and 15  Paclitaxel (Taxol) 80 mg/m2 IV on Days 1, 8, and 15  28-day cycle    Lyssa JOYA et al. Ramucirumab plus paclitaxel versus placebo plus paclitaxel in patients with previously treated advanced gastric or gastro-oesophageal junction adenocarcinoma ([facility]): a double-blind, randomised phase 3 trial. Lancet Oncol. 2014 Oct;15(11):1224-35.        /70 mmHg  Pulse 80  Temp(Src) 37.3 °C (99.1 °F)  Resp 18  Ht 1.5 m (4' 11.06\")  Wt 54.7 kg (120 lb 9.5 oz)  BMI 24.31 kg/m2  SpO2 95% Body surface area is 1.51 meters squared.    ANC~ 1750 Plt = 270k   Hgb = 11.8     SCr = 0.63mg/dL CrCl ~ 85mL/min   LFT's = WNL TBili = 0.3  TSH = 0.55  Urine protein= negative   Pregnancy(beta Hcg) = WNL     Ramucirumab (Cyramza) 8 mg/kg x 54.7 kg = 437.6 mg   <5% difference, okay to treat with final written dose = 440 mg IV    Paclitaxel (Taxol) 80 mg/m² x 1.51 m² = 120.8 mg   <5% difference, okay to treat with final written dose = 120 mg IV      Stacey Mckenzie, PharmD                  "

## 2017-07-12 NOTE — PROGRESS NOTES
On July 12, 2017,  Rae Alejandro and Oncology Nurse Navigator Lashanda Rodrigez met with pt. while pt. was receiving treatment at [facility].  Pt. reports she has completed 13 chemotherapy cycles total and has been driving herself to and from treatments.  Pt. has friend, Crystal Hess listed as her contact number CRUZ Alejandro asked if pt. wanted CRUZ Alejandro to have contact with friend should she call regarding pt.'s treatment or for support.  Pt. stated she did not give permission for CRUZ Alejandro to contact Crystal Hess.  LICHA Rodrigez explained to pt. concerns of patient having unprotected sex with her partner.  Pt. admitted she does have unprotected sex with her boyfriend.  LICHA Rodrigez explained importance and education as to the need to have sex with protection.  Information was relayed to pt. by CRUZ Alejandro in Lithuanian.  LICHA Rodrigez explained the need to use protection to protect pt.'s boyfriend from chemotherapy exposure as well as to prevent pregnancy.  LICHA Rodrigez explained potential birth defects that could be a result of chemotherapy if pt. continues have unprotected sex and gets pregnant. Pt. shared her boyfriend left the house about a month ago to be with another woman.  Pt. states boyfriend loves her but everyone around him tells him she is too old for him.  Pt. states he still comes over to have sex with her and says if having unprotected sex will harm him that he deserves it.  CRUZ Alejandro reinforced what LICHA Rodrigez conveyed as to the risks to boyfriend or to her if she became pregnant.  Pt. stated she does not have any family or support in [place].  Pt.'s family is in [facility] [place] [facility] [place].  Pt. has three children, Melina (19 yrs. old), Aurelia (18 yrs. old), and Rusty (15 years old).  Pt. states her children don't talk to her and pt. stated Aurelia was the one who was acting out but has not gained employment.  Pt. states she does not want to live anymore.  CRUZ Alejandro asked pt. if she was actively suicidal/homicidal.  Pt. does  not have a plan and reports she is not suicidal.  Pt. states she does not want to live anymore because of everything that is going on with her cancer and the situation with her boyfriend.  Pt. shares she has been feeling this way for about a month and refuses to take anti-depressants.   Pt. states she previously had depression when she  from the father of her children years ago.  Pt. had asked about food resources and CRUZ Alejandro provided pt. with [facility] food bank list along with [facility] schedule.  Pt. did not want any further assistance.

## 2017-07-12 NOTE — PROGRESS NOTES
Chemotherapy Verification - SECONDARY RN       Height = 150 cm  Weight = 54.7 kg  BSA = 1.51 m2       Medication: Paclitaxel  Dose: 80 mg/m2  Calculated Dose: 120.8 mg                             (In mg/m2, AUC, mg/kg)     Medication: Ramucirumab  Dose: 8 mg/kg  Calculated Dose: 437.6 mg                             (In mg/m2, AUC, mg/kg)    I confirm that this process was performed independently.

## 2017-07-12 NOTE — PROGRESS NOTES
"Pharmacy Chemotherapy Verification    Patient Name: Melita Herrera  Dx: Metastatic gastric carcinoma    Protocol: Ramucirumab + Paclitaxel    Ramucirumab 8 mg/kg IV on days 1 and 15  Paclitaxel 80 mg/m2 IV on days 1, 8, and 15  28 day cycle  Lyssa H, Raymundo K, Marcus Barraza E, et al; [facility] Study Group. Ramucirumab plus paclitaxel versus placebo plus paclitaxel in patients with previously treated advanced gastric or gastro-oesophageal junction adenocarcinoma ([place]): a double-blind, randomised phase 3 trial. Lancet Oncol. 2014;15(11):8999-2284.    Allergies: Review of patient's allergies indicates no known allergies.  /70 mmHg  Pulse 80  Temp(Src) 37.3 °C (99.1 °F)  Resp 18  Ht 1.5 m (4' 11.06\")  Wt 54.7 kg (120 lb 9.5 oz)  BMI 24.31 kg/m2  SpO2 95%  Body surface area is 1.51 meters squared.    Labs 7/12/17  ANC ~1750 Plt = 270k   Hgb = 11.8    SCr = 0.63 mg/dL CrCl ~85 mL/min   AST/ALT/AP = WNL TBili = 0.3 TSH = 0.55    Urine protein = negative  Urine HcG = 0.7 (=WNL)    Drug Order   (Drug name, dose, route, IV Fluid & volume, frequency, number of doses) Cycle: 2 Day 1  Previous treatment: C1D15 6/28/17     Medication = Ramucirumab  Base Dose= 8 mg/kg  Calc Dose: Base Dose x 54.7 kg = 437.6 mg  Final Dose = 440 mg  Route = IV  Fluid & Volume =  mL  Admin Duration = Over 60 minutes   Administer first          <5% difference, OK to treat with final dose      Medication = PACLItaxel  Base Dose= 80 mg/m2  Calc Dose: Base Dose x 1.51 m2 = 120.8 mg  Final Dose = 120 mg  Route = 120 mg  Fluid & Volume =  mL  Admin Duration = Over 60 minutes             <5% difference, OK to treat with final dose          By my signature below, I confirm this process was performed independently with the BSA and all final chemotherapy dosing calculations congruent. I have reviewed the above chemotherapy order and that my calculation of the final dose and BSA (when applicable) corroborate those calculations " of the  pharmacist. Discrepancies of 5% or greater in the written dose have been addressed and documented within the EPIC Progress notes.    Pietro Marsh PharmD

## 2017-07-12 NOTE — PROGRESS NOTES
"Patient arrived to Infusion for D1 Cycle 3 Cyramza/Taxol infusion.   phone utilized for assessment and review of plan of care.  Pt reports only having an area on scalp that \"itches. Think its a pimple.\"  RN assessed, noticed small red bump on crown of head, resembling a clogged pore.  Pt denies any current s/e. MA from Dr. Medrano's office, notified RN that labs drawn yesterday were not in process by [facility] at this time.  Labs to be redrawn for treatment today.  Lidocaine injected at port site, port accessed in sterile field. Flushed with brisk blood return.  Labs and UA obtained.  Reviewed all results, UA noted to have signs of infection, despite pt stating she has no symptoms of a UTI at this time.  Per Cheyenne MILLER, ok to proceed with treatment, will obtain a culture on urine and treat if necessary for UTI.  Pre-meds given.  Cyramza infused per MAR without incident.  Taxol infused at max rate, pt tolerated well.  Port flushed per protocol, de-accessed and gauze dressing applied.  Reviewed follow up appts with patient.  Pt discharged home in good spirits under no apparent distress.    "

## 2017-07-14 LAB
BACTERIA UR CULT: NORMAL
SIGNIFICANT IND 70042: NORMAL
SITE SITE: NORMAL
SOURCE SOURCE: NORMAL

## 2017-07-17 ENCOUNTER — PATIENT OUTREACH (OUTPATIENT)
Dept: HEALTH INFORMATION MANAGEMENT | Facility: OTHER | Age: 47
End: 2017-07-17

## 2017-07-17 ENCOUNTER — HOSPITAL ENCOUNTER (EMERGENCY)
Facility: MEDICAL CENTER | Age: 47
End: 2017-07-17
Attending: EMERGENCY MEDICINE
Payer: MEDICAID

## 2017-07-17 VITALS
HEART RATE: 85 BPM | RESPIRATION RATE: 16 BRPM | WEIGHT: 117.06 LBS | TEMPERATURE: 98.6 F | OXYGEN SATURATION: 99 % | HEIGHT: 59 IN | SYSTOLIC BLOOD PRESSURE: 125 MMHG | DIASTOLIC BLOOD PRESSURE: 86 MMHG | BODY MASS INDEX: 23.6 KG/M2

## 2017-07-17 DIAGNOSIS — F43.21 SITUATIONAL DEPRESSION: ICD-10-CM

## 2017-07-17 DIAGNOSIS — C16.9 METASTASIS FROM GASTRIC CANCER (HCC): ICD-10-CM

## 2017-07-17 DIAGNOSIS — C79.9 METASTASIS FROM GASTRIC CANCER (HCC): ICD-10-CM

## 2017-07-17 DIAGNOSIS — G89.3 CANCER RELATED PAIN: ICD-10-CM

## 2017-07-17 LAB — POC BREATHALIZER: 0 PERCENT (ref 0–0.01)

## 2017-07-17 PROCEDURE — 99284 EMERGENCY DEPT VISIT MOD MDM: CPT

## 2017-07-17 PROCEDURE — 302970 POC BREATHALIZER

## 2017-07-17 RX ORDER — OXYCODONE HYDROCHLORIDE AND ACETAMINOPHEN 5; 325 MG/1; MG/1
1-2 TABLET ORAL EVERY 4 HOURS PRN
Qty: 10 TAB | Refills: 0 | Status: SHIPPED | OUTPATIENT
Start: 2017-07-17 | End: 2017-07-28 | Stop reason: SDUPTHER

## 2017-07-17 RX ORDER — OXYCODONE HYDROCHLORIDE AND ACETAMINOPHEN 5; 325 MG/1; MG/1
1-2 TABLET ORAL EVERY 4 HOURS PRN
OUTPATIENT
Start: 2017-07-17

## 2017-07-17 RX ORDER — LORAZEPAM 0.5 MG/1
0.5 TABLET ORAL EVERY 8 HOURS PRN
Qty: 30 TAB | Refills: 1 | Status: CANCELLED | OUTPATIENT
Start: 2017-07-17

## 2017-07-17 RX ORDER — LORAZEPAM 0.5 MG/1
0.5 TABLET ORAL EVERY 8 HOURS PRN
Qty: 30 TAB | Refills: 1 | Status: SHIPPED | OUTPATIENT
Start: 2017-07-17 | End: 2017-07-17

## 2017-07-17 RX ORDER — HYDROCODONE BITARTRATE AND ACETAMINOPHEN 5; 325 MG/1; MG/1
1 TABLET ORAL EVERY 4 HOURS PRN
COMMUNITY
End: 2017-07-26

## 2017-07-17 RX ORDER — OXYCODONE HYDROCHLORIDE AND ACETAMINOPHEN 5; 325 MG/1; MG/1
1 TABLET ORAL EVERY 6 HOURS PRN
Qty: 30 TAB | Refills: 0 | Status: SHIPPED | OUTPATIENT
Start: 2017-07-17 | End: 2017-07-17

## 2017-07-17 RX ORDER — LORAZEPAM 0.5 MG/1
0.5 TABLET ORAL EVERY 8 HOURS PRN
Qty: 5 TAB | Refills: 0 | Status: SHIPPED | OUTPATIENT
Start: 2017-07-17 | End: 2017-07-28 | Stop reason: SDUPTHER

## 2017-07-17 ASSESSMENT — ENCOUNTER SYMPTOMS
DEPRESSION: 1
CHILLS: 0
FEVER: 0

## 2017-07-17 NOTE — ED NOTES
VSS.  Discharge instructions, resources and prescriptions x2 given- verbalizes understanding. Ambulatory to lobby with steady gait.

## 2017-07-17 NOTE — ED AVS SNAPSHOT
Home Care Instructions                                                                                                                Melita Herrera   MRN: [id number]    Department:  [facility], Emergency Dept   Date of Visit:  7/17/2017            [facility], Emergency Dept    [address]    [place] [address]    Phone:  [phone]      You were seen by     Jae Atkinson M.D.      Your Diagnosis Was     Metastasis from gastric cancer (CMS-HCC)     C79.9, C16.9       Medication Information     Review all of your home medications and newly ordered medications with your primary doctor and/or pharmacist as soon as possible. Follow medication instructions as directed by your doctor and/or pharmacist.     Please keep your complete medication list with you and share with your physician. Update the information when medications are discontinued, doses are changed, or new medications (including over-the-counter products) are added; and carry medication information at all times in the event of emergency situations.               Medication List      START taking these medications        Instructions    Morning Afternoon Evening Bedtime    lorazepam 0.5 MG Tabs   Commonly known as:  ATIVAN        Take 1 Tab by mouth every 8 hours as needed for Anxiety for up to 5 doses.   Dose:  0.5 mg                          CHANGE how you take these medications        Instructions    Morning Afternoon Evening Bedtime    * oxycodone-acetaminophen 5-325 MG Tabs   What changed:  Another medication with the same name was added. Make sure you understand how and when to take each.   Commonly known as:  PERCOCET        Take 1 Tab by mouth every 6 hours as needed.   Dose:  1 Tab                        * oxycodone-acetaminophen 5-325 MG Tabs   What changed:  You were already taking a medication with the same name, and this prescription was added. Make sure you understand how and when to  take each.   Commonly known as:  PERCOCET        Take 1-2 Tabs by mouth every four hours as needed.   Dose:  1-2 Tab                        * Notice:  This list has 2 medication(s) that are the same as other medications prescribed for you. Read the directions carefully, and ask your doctor or other care provider to review them with you.      ASK your doctor about these medications        Instructions    Morning Afternoon Evening Bedtime    FOLIC ACID PO        Take  by mouth.                        hydrocodone-acetaminophen 5-325 MG Tabs per tablet   Commonly known as:  NORCO        Take 1 Tab by mouth every four hours as needed.   Dose:  1 Tab                        ondansetron 4 MG Tabs tablet   Commonly known as:  ZOFRAN        Take 1 Tab by mouth every four hours as needed for Nausea/Vomiting.   Dose:  4 mg                        prochlorperazine 10 MG Tabs   Commonly known as:  COMPAZINE        Take 1 Tab by mouth every 6 hours as needed.   Dose:  10 mg                             Where to Get Your Medications      You can get these medications from any pharmacy     Bring a paper prescription for each of these medications    - lorazepam 0.5 MG Tabs  - oxycodone-acetaminophen 5-325 MG Tabs            Procedures and tests performed during your visit     POC BREATHALIZER        Discharge Instructions       Depresión en el adulto   (Depression, Adult)  La depresión es un sentimiento de tristeza, decaimiento, sufrimiento espiritual o vacío. Hay dos tipos de depresión:   1. La depresión que todos experimentamos de tanto en tanto debido a experiencias de la samir inquietantes, kody la pérdida del trabajo o el final de latonia relación (tristeza normal o duelo normal). Sue tipo de depresión se considera normal, es de corta duración y se resuelve entre unos pocos días y 2 semanas. (La depresión que se experimenta tras la pérdida de un ser querido se llama duelo. El duelo en general dura más de 2 semanas, supa normalmente  mejora con el tiempo).  2. La depresión clínica, es la que dura más que la tristeza o duelo normal o la que interfiere con fritz capacidad de desenvolverse en el hogar, en el trabajo y en la escuela. También interfiere en las relaciones personales. Afecta mohamud todos los aspectos de la samir. La depresión clínica es latonia enfermedad.  Los síntomas de depresión pueden tener fritz origen en otras afecciones que no afshan la tristeza y el duelo o la depresión clínica. Ejemplos de estas afecciones son:   · Enfermedades físicas: Algunas enfermedades físicas, incluyendo poca actividad de la glándula tiroides (hipotiroidismo), anemia grave, ciertos tipos de cáncer, diabetes, convulsiones incontrolables, problemas cardíacos y pulmonares, ictus y el dolor crónico se asocian con síntomas de depresión.  · Efectos secundarios de algún medicamento recetado: En algunas personas, ciertos tipos de medicamentos recetados pueden causar síntomas de depresión.  · Abuso de sustancias: El abuso de alcohol y drogas puede causar síntomas de depresión.  SÍNTOMAS  Los síntomas de tristeza y duelo normal son:   · Sentirse sy o llorar jennifer períodos cortos de tiempo.  · Falta de preocupación por todo (apatía).  · Dificultad para dormir o dormir demasiado.  · No poder disfrutar de las cosas que antes disfrutaba.  · El deseo de estar solo todo el tiempo (aislamiento social).  · Falta de energía o motivación.  · Dificultad para concentrarse o recordar.  · Cambios en el apetito o en el peso.  · Inquietud o agitación.  Los síntomas de la depresión clínica son los mismos de la tristeza o duelo normal y también incluyen los siguientes síntomas:   · Sentirse sy o llorar todo el tiempo.  · Sentimientos de culpa o inutilidad.  · Sentimientos de desesperanza o desamparo.  · Pensamientos de suicidio o el deseo de dañarse a sí mismo (ideas suicidas).  · Pérdida de contacto con la realidad (síntomas psicóticos). Dung o escuchar cosas que no son reales  (alucinaciones)o tener creencias falsas acerca de fritz samir o de las personas que lo rodean (delirios y paranoia).  DIAGNÓSTICO   El diagnóstico de la depresión clínica se basa en la gravedad y duración de los síntomas. El médico le hará preguntas sobre fritz historia clínica y el uso de sustancias para determinar si latonia enfermedad física, el uso de medicamentos recetados, o el abuso de sustancias es la causa de fritz depresión. Fritz médico también puede indicar análisis de yanet.   TRATAMIENTO   Por lo general, la tristeza y el duelo normal no requieren tratamiento. Rubén a veces se recetan antidepresivos jennifer el duelo para aliviar los síntomas de depresión hasta que se resuelven.   El tratamiento para la depresión clínica depende de la gravedad de los síntomas, rubén suele incluir antidepresivos, psicoterapia con un profesional de la osito mental o latonia combinación de ambos. El médico le ayudará a determinar qué tratamiento es el mejor para usted.   La depresión causada por latonia enfermedad física generalmente desaparece con tratamiento médico adecuado de la enfermedad. Si un medicamento recetado le causa depresión, hable con fritz médico acerca de suspenderlo, disminuir la dosis o sustituirlo por otro medicamento.   La depresión causada por el abuso de alcohol o abuso de drogas ilícitas se va con la abstinencia de estas sustancias. Algunos adultos necesitan ayuda profesional con el fin de dejar de beber o usar drogas.   SOLICITE ATENCIÓN MÉDICA DE INMEDIATO SI:   · Tiene pensamientos acerca de lastimarse o dañar a otras personas.  · Pierde el contacto con la realidad (tiene síntomas psicóticos).  · Está tomando medicamentos para la depresión y tiene efectos colaterales graves.  PARA OBTENER MÁS INFORMACIÓN  National [place] on Mental Illness: www.tatum.org    National [place] of Mental Health: www.nimh.nih.gov    Document Released: 12/18/2006 Document Revised: 06/18/2013  ExitCare® Patient Information ©2014 [facility].            Patient Information     Patient Information    Following emergency treatment: all patient requiring follow-up care must return either to a private physician or a clinic if your condition worsens before you are able to obtain further medical attention, please return to the emergency room.     Billing Information    At [facility], we work to make the billing process streamlined for our patients.  Our Representatives are here to answer any questions you may have regarding your hospital bill.  If you have insurance coverage and have supplied your insurance information to us, we will submit a claim to your insurer on your behalf.  Should you have any questions regarding your bill, we can be reached online or by phone as follows:  Online: You are able pay your bills online or live chat with our representatives about any billing questions you may have. We are here to help Monday - Friday from 8:00am to 7:30pm and 9:00am - 12:00pm on Saturdays.  Please visit [email or web address]  for more information.   Phone:  [phone] or [phone]    Please note that your emergency physician, surgeon, pathologist, radiologist, anesthesiologist, and other specialists are not employed by [facility] and will therefore bill separately for their services.  Please contact them directly for any questions concerning their bills at the numbers below:     Emergency Physician Services:  [phone]  [facility]:  [phone]  [facility]:  [phone]  [facility]:  [phone]    1. Your final bill may vary from the amount quoted upon discharge if all procedures are not complete at that time, or if your doctor has additional procedures of which we are not aware. You will receive an additional bill if you return to the Emergency Department at [facility] for suture removal regardless of the facility of which the sutures were placed.      2. Please arrange for settlement of this account at the emergency registration.    3. All self-pay accounts are due in full at the time of treatment.  If you are unable to meet this obligation then payment is expected within 4-5 days.     4. If you have had radiology studies (CT, X-ray, Ultrasound, MRI), you have received a preliminary result during your emergency department visit. Please contact the radiology department [phone] to receive a copy of your final result. Please discuss the Final result with your primary physician or with the follow up physician provided.     Crisis Hotline:  [place] Crisis Hotline:  7-960-AOKMIJM or 1-490.842.1218  [place] Crisis Hotline:    1-340.218.4263 or 479-400-2675         ED Discharge Follow Up Questions    1. In order to provide you with very good care, we would like to follow up with a phone call in the next few days.  May we have your permission to contact you?     YES /  NO    2. What is the best phone number to call you? (       )_____-__________    3. What is the best time to call you?      Morning  /  Afternoon  /  Evening                   Patient Signature:  ____________________________________________________________    Date:  ____________________________________________________________      Your appointments     Jul 19, 2017  8:30 AM   Est Chemo 3 with RN 4   Infusion Services ([facility])    [address] [place] [address]  [place] [address]   [phone]            Jul 26, 2017 11:30 AM   Est Chemo 3 with RN 4   Infusion [facility] ([facility])    [address] [place] [address]  [place] [address]   [phone]            Aug 07, 2017  1:00 PM   CT BODY WITH with [address]   [facility] - CT - [address]    [address] [place] [address]   [phone]           Some exams require specific prep instructions that would have been given to you at time of scheduling. If you have any additional questions about the prep instructions, please call  Imaging Scheduling at [phone] and press #2.            Aug 08, 2017  8:30 AM   Non Provider 1 with ONC MA 1   [facility] (--)    [address], [address]  [facility] [id number] [phone]           You will be receiving a confirmation call a few days before your appointment from our automated call confirmation system.            Aug 08, 2017  9:40 AM   ONCOLOGY EST PATIENT 30 MIN with Ramana Medrano M.D.   [facility] (--)    [address], [address]  [place] [id number] [phone]            Aug 09, 2017  9:30 AM   Est Chemo 3 with RN 5   Infusion [facility] ([facility])    [address]  [place] [address]   [phone]            Aug 16, 2017  9:30 AM   Est Chemo 3 with RN 5   [facility] ([facility])    [address]  [place] [address]   [phone]            Aug 23, 2017  9:30 AM   Est Chemo 3 with RN 5   Infusion [facility] ([facility])    [id number]  [facility] [address]   [phone]

## 2017-07-17 NOTE — ED PROVIDER NOTES
ED Provider Note    Scribed for Jae Atkinson M.D. by Juliana Brownlee. 7/17/2017, 2:16 PM.    Primary care provider: HUY Pan  Means of arrival: walk-in  History obtained from: Patient   History limited by: None    CHIEF COMPLAINT  Chief Complaint   Patient presents with   • Suicidal Ideation     pt was brought to ER from oncology today where she expressed severe depression and not wanting to live anymore.  pt was placed on legal hold by JUSTIN Willson,  pt is denying SI to this RN and stating she is fine.  pt appears sad and agitated at this time.      HPI  Melita Herrera is a 46 y.o. female who presents to the Emergency Department for depression and suicidal ideation that was expressed today while the patient was at her oncologist office. Per patient she was evaluated at her oncologist office today by JUSTIN Willson, because she is starting her fifth round of chemotherapy in two days. The patient currently denies any suicidal ideation and states that she is happy. The patient denies any past suicide attempts. She denies having access to any fire arms. She reports that she is here for pain medications and ativan.     REVIEW OF SYSTEMS  Review of Systems   Constitutional: Negative for fever and chills.   Psychiatric/Behavioral: Positive for depression. Negative for suicidal ideas.        No suicide attempts, no access to firearms    All other systems reviewed and are negative.  C    PAST MEDICAL HISTORY   has a past medical history of Metastasis from gastric cancer (CMS-HCC) (5/19/2017).    SURGICAL HISTORY  patient denies any surgical history    SOCIAL HISTORY  Social History   Substance Use Topics   • Smoking status: Never Smoker    • Smokeless tobacco: None   • Alcohol Use: No      History   Drug Use No     FAMILY HISTORY  History reviewed. No pertinent family history.    CURRENT MEDICATIONS  Home Medications     Reviewed by Delta Gant R.N. (Registered  "Nurse) on 07/17/17 at 1357  Med List Status: Complete    Medication Last Dose Status    FOLIC ACID PO PRN Active    hydrocodone-acetaminophen (NORCO) 5-325 MG Tab per tablet 7/16/2017 Active    ondansetron (ZOFRAN) 4 MG Tab tablet PRN Active    oxycodone-acetaminophen (PERCOCET) 5-325 MG Tab 7/16/2017 Active    prochlorperazine (COMPAZINE) 10 MG Tab PRN Active              ALLERGIES  No Known Allergies    PHYSICAL EXAM  VITAL SIGNS: /86 mmHg  Pulse 88  Temp(Src) 37 °C (98.6 °F) (Temporal)  Resp 18  Ht 1.499 m (4' 11\")  Wt 53.1 kg (117 lb 1 oz)  BMI 23.63 kg/m2  SpO2 99%  LMP 03/08/2009  Breastfeeding? Unknown    Constitutional: Well developed, Well nourished, No acute distress, Non-toxic appearance.   HENT: Normocephalic, Atraumatic, Bilateral external ears normal, oropharynx moist, No oral exudates, Nose normal.   Eyes: Pupils are equal round and react to light, extraocular motions are intact, conjunctiva is normal, there are no signs of exudate.   Neck: Supple, no meningeal signs.  Lymphatic: No lymphadenopathy noted.   Cardiovascular: Regular rate and rhythm without murmurs gallops or rubs.   Thorax & Lungs: No respiratory distress. Breathing comfortably. Lungs are clear to auscultation bilaterally, there are no wheezes no rales. Chest wall is nontender.  Abdomen: Soft, nontender, nondistended. Bowel sounds are present.   Skin: Warm, Dry, No erythema,   Back: No tenderness, No CVA tenderness.  Musculoskeletal: Good range of motion in all major joints. No tenderness to palpation or major deformities noted. Intact distal pulses, no clubbing, no cyanosis, no edema,   Neurologic: Alert & oriented x 3, Moving all extremities. No gross abnormalities.    Psychiatric: Affect normal, Judgment normal, Mood normal. Denies suicidal ideation     LABS  Results for orders placed or performed during the hospital encounter of 07/17/17   POC BREATHALIZER   Result Value Ref Range    POC Breathalizer 0.00 0.00 - 0.01 " Percent   All labs reviewed by me.    COURSE & MEDICAL DECISION MAKING  Pertinent Labs & Imaging studies reviewed. (See chart for details)    2:16 PM - Patient seen and examined at bedside. Ordered beta HCG qual, urine drug screen, POC breathalyzer, Refractometer SG to evaluate her symptoms. Life skills is consulting on the patient.     3:07 PM- Consult with life skills, he feels that the patient has situational depression and is not suicidal. Appropriate paperwork has been obtained. I am prescribing the patient ativan and percocet and advised follow up with her oncologist as scheduled. She was advised to return to the emergency department for any worsening symptoms including suicidal ideation.       Decision Making:  Patient currently is not suicidal. She is feeling depressed because her current diagnosis. At this point. If the patient stable, and I do feel the patient is able to be discharged.    I reviewed prescription monitoring program for patient's narcotic use before prescribing a scheduled drug.The patient will not drink alcohol nor drive with prescribed medications. The patient will return for new or worsening symptoms and is stable at the time of discharge.    The patient is referred to a primary physician for blood pressure management, diabetic screening, and for all other preventative health concerns.    DISPOSITION:  Patient will be discharged home in stable condition.    FOLLOW UP:  No follow-up provider specified.    OUTPATIENT MEDICATIONS:  Discharge Medication List as of 7/17/2017  3:17 PM      START taking these medications    Details   lorazepam (ATIVAN) 0.5 MG Tab Take 1 Tab by mouth every 8 hours as needed for Anxiety for up to 5 doses., Disp-5 Tab, R-0, Print Rx Paper      !! oxycodone-acetaminophen (PERCOCET) 5-325 MG Tab Take 1-2 Tabs by mouth every four hours as needed., Disp-10 Tab, R-0, Print Rx Paper       !! - Potential duplicate medications found. Please discuss with provider.         FINAL IMPRESSION  1. Metastasis from gastric cancer (CMS-HCC)    2. Situational depression        I, Juliana Brownlee (Scribe), am scribing for, and in the presence of, Jae Atkinson M.D..    Electronically signed by: Juliana Brownlee (Scribe), 7/17/2017    IJae M.D. personally performed the services described in this documentation, as scribed by Juliana Brownlee in my presence, and it is both accurate and complete.    The note accurately reflects work and decisions made by me.  Jae Atkinson  7/17/2017  10:16 PM

## 2017-07-17 NOTE — DISCHARGE INSTRUCTIONS
Depresión en el adulto   (Depression, Adult)  La depresión es un sentimiento de tristeza, decaimiento, sufrimiento espiritual o vacío. Hay dos tipos de depresión:   1. La depresión que todos experimentamos de tanto en tanto debido a experiencias de la samir inquietantes, kody la pérdida del trabajo o el final de latonia relación (tristeza normal o duelo normal). Sue tipo de depresión se considera normal, es de corta duración y se resuelve entre unos pocos días y 2 semanas. (La depresión que se experimenta tras la pérdida de un ser querido se llama duelo. El duelo en general dura más de 2 semanas, supa normalmente mejora con el tiempo).  2. La depresión clínica, es la que dura más que la tristeza o duelo normal o la que interfiere con fritz capacidad de desenvolverse en el hogar, en el trabajo y en la escuela. También interfiere en las relaciones personales. Afecta mohamud todos los aspectos de la samir. La depresión clínica es latonia enfermedad.  Los síntomas de depresión pueden tener fritz origen en otras afecciones que no afshan la tristeza y el duelo o la depresión clínica. Ejemplos de estas afecciones son:   · Enfermedades físicas: Algunas enfermedades físicas, incluyendo poca actividad de la glándula tiroides (hipotiroidismo), anemia grave, ciertos tipos de cáncer, diabetes, convulsiones incontrolables, problemas cardíacos y pulmonares, ictus y el dolor crónico se asocian con síntomas de depresión.  · Efectos secundarios de algún medicamento recetado: En algunas personas, ciertos tipos de medicamentos recetados pueden causar síntomas de depresión.  · Abuso de sustancias: El abuso de alcohol y drogas puede causar síntomas de depresión.  SÍNTOMAS  Los síntomas de tristeza y duelo normal son:   · Sentirse sy o llorar jennifer períodos cortos de tiempo.  · Falta de preocupación por todo (apatía).  · Dificultad para dormir o dormir demasiado.  · No poder disfrutar de las cosas que antes disfrutaba.  · El deseo de estar solo todo el  tiempo (aislamiento social).  · Falta de energía o motivación.  · Dificultad para concentrarse o recordar.  · Cambios en el apetito o en el peso.  · Inquietud o agitación.  Los síntomas de la depresión clínica son los mismos de la tristeza o duelo normal y también incluyen los siguientes síntomas:   · Sentirse sy o llorar todo el tiempo.  · Sentimientos de culpa o inutilidad.  · Sentimientos de desesperanza o desamparo.  · Pensamientos de suicidio o el deseo de dañarse a sí mismo (ideas suicidas).  · Pérdida de contacto con la realidad (síntomas psicóticos). Dung o escuchar cosas que no son reales (alucinaciones)o tener creencias falsas acerca de fritz samir o de las personas que lo rodean (delirios y paranoia).  DIAGNÓSTICO   El diagnóstico de la depresión clínica se basa en la gravedad y duración de los síntomas. El médico le hará preguntas sobre fritz historia clínica y el uso de sustancias para determinar si latonia enfermedad física, el uso de medicamentos recetados, o el abuso de sustancias es la causa de fritz depresión. Fritz médico también puede indicar análisis de yanet.   TRATAMIENTO   Por lo general, la tristeza y el duelo normal no requieren tratamiento. Rubén a veces se recetan antidepresivos jennifer el duelo para aliviar los síntomas de depresión hasta que se resuelven.   El tratamiento para la depresión clínica depende de la gravedad de los síntomas, rubén suele incluir antidepresivos, psicoterapia con un profesional de la osito mental o latonia combinación de ambos. El médico le ayudará a determinar qué tratamiento es el mejor para usted.   La depresión causada por latonia enfermedad física generalmente desaparece con tratamiento médico adecuado de la enfermedad. Si un medicamento recetado le causa depresión, hable con fritz médico acerca de suspenderlo, disminuir la dosis o sustituirlo por otro medicamento.   La depresión causada por el abuso de alcohol o abuso de drogas ilícitas se va con la abstinencia de estas  sustancias. Algunos adultos necesitan ayuda profesional con el fin de dejar de beber o usar drogas.   SOLICITE ATENCIÓN MÉDICA DE INMEDIATO SI:   · Tiene pensamientos acerca de lastimarse o dañar a otras personas.  · Pierde el contacto con la realidad (tiene síntomas psicóticos).  · Está tomando medicamentos para la depresión y tiene efectos colaterales graves.  PARA OBTENER MÁS INFORMACIÓN  National [place] on Mental Illness: www.tatum.org    National [employer] of Mental Health: www.nimh.nih.gov    Document Released: 12/18/2006 Document Revised: 06/18/2013  ExitCare® Patient Information ©2014 [employer].

## 2017-07-17 NOTE — ED AVS SNAPSHOT
InRadio Access Code: Activation code not generated  Current InRadio Status: Patient Declined    Your email address is not on file at [facility].  Email Addresses are required for you to sign up for InRadio, please contact [phone] to verify your personal information and to provide your email address prior to attempting to register for InRadio.    Melita Herrera  [address] [place] [address]    Narviit  A secure, online tool to manage your health information     [facility]’s InRadio® is a secure, online tool that connects you to your personalized health information from the privacy of your home -- day or night - making it very easy for you to manage your healthcare. Once the activation process is completed, you can even access your medical information using the InRadio dino, which is available for free in the Apple Dino store or Google Play store.     To learn more about InRadio, visit [email or web address]    There are two levels of access available (as shown below):   My Chart Features  [facility] Primary Care Doctor [facility]  Specialists [facility]  Urgent  Care Non-[facility] Primary Care Doctor   Email your healthcare team securely and privately 24/7 X X X    Manage appointments: schedule your next appointment; view details of past/upcoming appointments X      Request prescription refills. X      View recent personal medical records, including lab and immunizations X X X X   View health record, including health history, allergies, medications X X X X   Read reports about your outpatient visits, procedures, consult and ER notes X X X X   See your discharge summary, which is a recap of your hospital and/or ER visit that includes your diagnosis, lab results, and care plan X X  X     How to register for Narviit:  Once your e-mail address has been verified, follow the following steps to sign up for Narviit.     1. Go to  [email or web address]  2. Click on the Sign Up Now box, which takes  you to the New Member Sign Up page. You will need to provide the following information:  a. Enter your [employer] vascular solution Access Code exactly as it appears at the top of this page. (You will not need to use this code after you’ve completed the sign-up process. If you do not sign up before the expiration date, you must request a new code.)   b. Enter your date of birth.   c. Enter your home email address.   d. Click Submit, and follow the next screen’s instructions.  3. Create a [employer]ix vascular solution ID. This will be your [employer] vascular solution login ID and cannot be changed, so think of one that is secure and easy to remember.  4. Create a [employer] vascular solution password. You can change your password at any time.  5. Enter your Password Reset Question and Answer. This can be used at a later time if you forget your password.   6. Enter your e-mail address. This allows you to receive e-mail notifications when new information is available in Biomedix vascular solution.  7. Click Sign Up. You can now view your health information.    For assistance activating your [employer] vascular solution account, call [phone]

## 2017-07-17 NOTE — ED AVS SNAPSHOT
7/17/2017    Melita Herrera  [address]  [address]  [facility] [place] [facility] [address]    Dear Melita:    [facility] wants to ensure your discharge home is safe and you or your loved ones have had all of your questions answered regarding your care after you leave the hospital.    Below is a list of resources and contact information should you have any questions regarding your hospital stay, follow-up instructions, or active medical symptoms.    Questions or Concerns Regarding… Contact   Medical Questions Related to Your Discharge  (7 days a week, 8am-5pm) Contact a Nurse Care Coordinator   [phone]   Medical Questions Not Related to Your Discharge  (24 hours a day / 7 days a week)  Contact the Nurse Health Line   [phone]    Medications or Discharge Instructions Refer to your discharge packet   or contact your [facility] Primary Care Provider   [phone]   Follow-up Appointment(s) Schedule your appointment via [employer]   or contact Scheduling [phone]   Billing Review your statement via [employer]  or contact Billing [phone]   Medical Records Review your records via [employer]   or contact Medical Records [phone]     You may receive a telephone call within two days of discharge. This call is to make certain you understand your discharge instructions and have the opportunity to have any questions answered. You can also easily access your medical information, test results and upcoming appointments via the [employer] free online health management tool. You can learn more and sign up at [employer]. For assistance setting up your [employer] account, please call [phone].    Once again, we want to ensure your discharge home is safe and that you have a clear understanding of any next steps in your care. If you have any questions or concerns, please do not hesitate to contact us, we are here for you. Thank you for choosing [facility] for your healthcare needs.    Sincerely,    Your [facility] Healthcare Team

## 2017-07-17 NOTE — ED NOTES
Box lunch provided to pt, pt reporting she is very hungry. Eating box lunch and waiting for Life skills Von to perform eval. iPad in triage room for eval.

## 2017-07-17 NOTE — TELEPHONE ENCOUNTER
Was the patient seen in the last year in this department? Yes     Does patient have an active prescription for medications requested? No     Received Request Via: Patient     Thank you

## 2017-07-17 NOTE — ED NOTES
Pt amb to bathroom for urine and POC breathalyzer. Pt upset, not wanting to be in hospital and perform protocol exams.

## 2017-07-17 NOTE — ED NOTES
Melita Herrera  Chief Complaint   Patient presents with   • Suicidal Ideation     pt was brought to ER from oncology today where she expressed severe depression and not wanting to live anymore.  pt was placed on legal hold by JUSTIN Willson,  pt is denying SI to this RN and stating she is fine.  pt appears sad and agitated at this time.      Pt ambulatory to triage, denying above complaint.  Charge RN aware, Von, Life Skills with another pt.

## 2017-07-18 ENCOUNTER — TELEPHONE (OUTPATIENT)
Dept: HEMATOLOGY ONCOLOGY | Facility: MEDICAL CENTER | Age: 47
End: 2017-07-18

## 2017-07-18 NOTE — TELEPHONE ENCOUNTER
"Patient came in on Monday, July 17 requesting Percocet and Ativan. Patient has not had Percocet refill since 2012. She has been taking Norco for her abdominal pain. Last Ativan was filled in May by Dr. Medrano for anxiety. I reviewed patient's chart and read the encounter with  Javon and nurse navigator Lashanda Rodrigez from July 12. There was some discussion about patient being very upset and sad and not wanting to live anymore. Patient did state that she was not suicidal at that time. I did ask to speak to the patient briefly before giving her her prescription refills. With the use of a medical assistant who speaks Japanese I asked how she was doing and she began to cry. Patient was brought into a consult room and she began to say that she was very upset and that her \"heart was hurting.\" She stated that she thinks about suicide but at this time she did not have a plan. She did mention that her boyfriend had recently left her in that she does not have any friends or family around while undergoing treatment for her metastatic gastric cancer. I did contact the oncology social worker, Rae Alejandro and asked for her presence during further conversations. Patient did mention on multiple occasions that she did not want to live anymore. After further discussion that I wanted to get her help patient seemed agreeable to that help, until I told her I needed her to go into the hospital. Patient was wanting medication for depression but did not want to go into the hospital. After further conversations there is concern that patient would go home and harm herself today and I did sign the legal paperwork for a legal hold. We did ask the patient to go to the emergency department with us but she refused initially. After few minutes patient did agree and she was escorted by myself as well as  Mata to the emergency department.    I did initially fill the Ativan and Percocet before speaking with the patient. However " after the turn of events that occurred new prescriptions were shredded this patient was admitted to the emergency department.
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
## 2017-07-19 ENCOUNTER — OUTPATIENT INFUSION SERVICES (OUTPATIENT)
Dept: ONCOLOGY | Facility: MEDICAL CENTER | Age: 47
End: 2017-07-19
Attending: INTERNAL MEDICINE
Payer: MEDICAID

## 2017-07-19 VITALS
WEIGHT: 118.39 LBS | SYSTOLIC BLOOD PRESSURE: 114 MMHG | TEMPERATURE: 99 F | BODY MASS INDEX: 23.87 KG/M2 | RESPIRATION RATE: 20 BRPM | OXYGEN SATURATION: 96 % | HEIGHT: 59 IN | DIASTOLIC BLOOD PRESSURE: 75 MMHG | HEART RATE: 85 BPM

## 2017-07-19 DIAGNOSIS — C16.9 METASTASIS FROM GASTRIC CANCER (HCC): ICD-10-CM

## 2017-07-19 DIAGNOSIS — C79.9 METASTASIS FROM GASTRIC CANCER (HCC): ICD-10-CM

## 2017-07-19 LAB
ALBUMIN SERPL BCP-MCNC: 3.3 G/DL (ref 3.2–4.9)
ALBUMIN/GLOB SERPL: 1.3 G/DL
ALP SERPL-CCNC: 77 U/L (ref 30–99)
ALT SERPL-CCNC: 11 U/L (ref 2–50)
ANION GAP SERPL CALC-SCNC: 9 MMOL/L (ref 0–11.9)
AST SERPL-CCNC: 14 U/L (ref 12–45)
BASOPHILS # BLD AUTO: 0.5 % (ref 0–1.8)
BASOPHILS # BLD: 0.02 K/UL (ref 0–0.12)
BILIRUB SERPL-MCNC: 0.3 MG/DL (ref 0.1–1.5)
BUN SERPL-MCNC: 18 MG/DL (ref 8–22)
CALCIUM SERPL-MCNC: 8.7 MG/DL (ref 8.5–10.5)
CHLORIDE SERPL-SCNC: 105 MMOL/L (ref 96–112)
CO2 SERPL-SCNC: 24 MMOL/L (ref 20–33)
CREAT SERPL-MCNC: 0.6 MG/DL (ref 0.5–1.4)
EOSINOPHIL # BLD AUTO: 0.08 K/UL (ref 0–0.51)
EOSINOPHIL NFR BLD: 1.8 % (ref 0–6.9)
ERYTHROCYTE [DISTWIDTH] IN BLOOD BY AUTOMATED COUNT: 50.4 FL (ref 35.9–50)
GFR SERPL CREATININE-BSD FRML MDRD: >60 ML/MIN/1.73 M 2
GLOBULIN SER CALC-MCNC: 2.6 G/DL (ref 1.9–3.5)
GLUCOSE SERPL-MCNC: 129 MG/DL (ref 65–99)
HCT VFR BLD AUTO: 31.4 % (ref 37–47)
HGB BLD-MCNC: 10.1 G/DL (ref 12–16)
IMM GRANULOCYTES # BLD AUTO: 0.01 K/UL (ref 0–0.11)
IMM GRANULOCYTES NFR BLD AUTO: 0.2 % (ref 0–0.9)
LYMPHOCYTES # BLD AUTO: 0.87 K/UL (ref 1–4.8)
LYMPHOCYTES NFR BLD: 20.1 % (ref 22–41)
MCH RBC QN AUTO: 27.4 PG (ref 27–33)
MCHC RBC AUTO-ENTMCNC: 32.2 G/DL (ref 33.6–35)
MCV RBC AUTO: 85.1 FL (ref 81.4–97.8)
MONOCYTES # BLD AUTO: 0.27 K/UL (ref 0–0.85)
MONOCYTES NFR BLD AUTO: 6.2 % (ref 0–13.4)
NEUTROPHILS # BLD AUTO: 3.08 K/UL (ref 2–7.15)
NEUTROPHILS NFR BLD: 71.2 % (ref 44–72)
NRBC # BLD AUTO: 0 K/UL
NRBC BLD AUTO-RTO: 0 /100 WBC
PLATELET # BLD AUTO: 272 K/UL (ref 164–446)
PMV BLD AUTO: 10.1 FL (ref 9–12.9)
POTASSIUM SERPL-SCNC: 4.6 MMOL/L (ref 3.6–5.5)
PROT SERPL-MCNC: 5.9 G/DL (ref 6–8.2)
RBC # BLD AUTO: 3.69 M/UL (ref 4.2–5.4)
SODIUM SERPL-SCNC: 138 MMOL/L (ref 135–145)
WBC # BLD AUTO: 4.3 K/UL (ref 4.8–10.8)

## 2017-07-19 PROCEDURE — 700111 HCHG RX REV CODE 636 W/ 250 OVERRIDE (IP): Performed by: INTERNAL MEDICINE

## 2017-07-19 PROCEDURE — 96375 TX/PRO/DX INJ NEW DRUG ADDON: CPT

## 2017-07-19 PROCEDURE — 700111 HCHG RX REV CODE 636 W/ 250 OVERRIDE (IP)

## 2017-07-19 PROCEDURE — 80053 COMPREHEN METABOLIC PANEL: CPT

## 2017-07-19 PROCEDURE — 700102 HCHG RX REV CODE 250 W/ 637 OVERRIDE(OP): Performed by: INTERNAL MEDICINE

## 2017-07-19 PROCEDURE — 85025 COMPLETE CBC W/AUTO DIFF WBC: CPT

## 2017-07-19 PROCEDURE — 96413 CHEMO IV INFUSION 1 HR: CPT

## 2017-07-19 PROCEDURE — A4212 NON CORING NEEDLE OR STYLET: HCPCS

## 2017-07-19 PROCEDURE — 304540 HCHG NITRO SET VENT 2ND TUB

## 2017-07-19 PROCEDURE — 700105 HCHG RX REV CODE 258: Performed by: INTERNAL MEDICINE

## 2017-07-19 PROCEDURE — A9270 NON-COVERED ITEM OR SERVICE: HCPCS | Performed by: INTERNAL MEDICINE

## 2017-07-19 PROCEDURE — 700101 HCHG RX REV CODE 250

## 2017-07-19 RX ORDER — DIPHENHYDRAMINE HCL 25 MG
50 TABLET ORAL ONCE
Status: COMPLETED | OUTPATIENT
Start: 2017-07-19 | End: 2017-07-19

## 2017-07-19 RX ORDER — ACETAMINOPHEN 325 MG/1
650 TABLET ORAL ONCE
Status: COMPLETED | OUTPATIENT
Start: 2017-07-19 | End: 2017-07-19

## 2017-07-19 RX ORDER — LIDOCAINE HYDROCHLORIDE 10 MG/ML
INJECTION, SOLUTION INFILTRATION; PERINEURAL
Status: COMPLETED
Start: 2017-07-19 | End: 2017-07-19

## 2017-07-19 RX ADMIN — ONDANSETRON HYDROCHLORIDE 16 MG: 2 SOLUTION INTRAMUSCULAR; INTRAVENOUS at 10:00

## 2017-07-19 RX ADMIN — DIPHENHYDRAMINE HCL 50 MG: 25 TABLET ORAL at 09:27

## 2017-07-19 RX ADMIN — HEPARIN 500 UNITS: 100 SYRINGE at 11:55

## 2017-07-19 RX ADMIN — PACLITAXEL 120 MG: 6 INJECTION, SOLUTION, CONCENTRATE INTRAVENOUS at 10:38

## 2017-07-19 RX ADMIN — FAMOTIDINE 20 MG: 10 INJECTION INTRAVENOUS at 09:30

## 2017-07-19 RX ADMIN — DEXAMETHASONE SODIUM PHOSPHATE 12 MG: 4 INJECTION, SOLUTION INTRAMUSCULAR; INTRAVENOUS at 09:45

## 2017-07-19 RX ADMIN — LIDOCAINE HYDROCHLORIDE: 10 INJECTION, SOLUTION INFILTRATION; PERINEURAL at 08:38

## 2017-07-19 RX ADMIN — ACETAMINOPHEN 650 MG: 325 TABLET, FILM COATED ORAL at 09:27

## 2017-07-19 ASSESSMENT — PAIN SCALES - GENERAL: PAINLEVEL: NO PAIN

## 2017-07-19 NOTE — PROGRESS NOTES
Chemotherapy Verification - SECONDARY RN       Height = 150 cm  Weight = 53.7 kg  BSA = 1.495 m2       Medication: Taxol  Dose: 80 mg/m2  Calculated Dose: 119.6 mg                             (In mg/m2, AUC, mg/kg)       I confirm that this process was performed independently.

## 2017-07-19 NOTE — PROGRESS NOTES
Chemotherapy Verification - PRIMARY RN      Height = 59 in  Weight = 118 lb  BSA = 1.5 m2       Medication: Taxol  Dose: 80 mg/m2  Calculated Dose: 120 mg                             (In mg/m2, AUC, mg/kg)        I confirm this process was performed independently with the BSA and all final chemotherapy dosing calculations congruent.  Any discrepancies of 5% or greater have been addressed with the chemotherapy pharmacist. The resolution of the discrepancy has been documented in the EPIC progress notes.

## 2017-07-19 NOTE — PROGRESS NOTES
"Pharmacy Chemotherapy Calculations Note:    Patient Name: Melita Herrera     Dx: metastatic gastric cancer    Cycle: 2 Day 8   Previous treatment:C2D1 on 7/12/17     Protocol: Cyramza + Taxol  Ramucirumab (Cyramza) 8 mg/kg IV on Days 1 and 15  Paclitaxel (Taxol) 80 mg/m2 IV on Days 1, 8, and 15  28-day cycle    Lyssa JOYA, et al. Ramucirumab plus paclitaxel versus placebo plus paclitaxel in patients with previously treated advanced gastric or gastro-oesophageal junction adenocarcinoma ([place]OW): a double-blind, randomised phase 3 trial. Lancet Oncol. 2014 Oct;15(11):1224-35.        /75 mmHg  Pulse 85  Temp(Src) 37.2 °C (99 °F)  Resp 20  Ht 1.5 m (4' 11.06\")  Wt 53.7 kg (118 lb 6.2 oz)  BMI 23.87 kg/m2  SpO2 96%  LMP 03/08/2009 Body surface area is 1.50 meters squared.    Labs 7/19/17 ANC~ 3080 Plt = 272 k   Hgb = 10.1     SCr = 0.6 mg/dL CrCl ~ 83 mL/min   LFT's = WNL TBili = 0.3    7/12/17TSH = 0.55  7/12/17 Urine protein= negative   6/28/17  Pregnancy(beta Hcg) = WNL        Paclitaxel (Taxol) 80 mg/m² x 1.5 m² = 120 mg   <5% difference, okay to treat with final written dose = 120 mg IV      Tracy Gates, PharmD                   "

## 2017-07-19 NOTE — PROGRESS NOTES
Mrs Reyes into [facility] for Day 8 / Cycle 2 of Taxol for Metastatic Gastric Cancer. Patient very angry and irritable, denies suicidal ideation. Pt denied having any new or acute complaints today, reports tolerating past treatments well. (Port accessed in a sterile manner, had + blood return, flushed briskly.) Pt given premedications and chemotherapy as prescribed, tolerated well, denied having any complaints during or after infusion. (Port had + blood return after, flushed per [facility] policy, de-accessed, needle intact, insertion site covered with sterile gauze and paper tape.) Discharged home to self care in [facility]. Appointment given for next treatment.

## 2017-07-19 NOTE — PROGRESS NOTES
"Pharmacy Chemotherapy Verification    Patient Name: Melita Herrera  Dx: Metastatic gastric carcinoma    Protocol: Ramucirumab + Paclitaxel    Ramucirumab 8 mg/kg IV on days 1 and 15  Paclitaxel 80 mg/m2 IV on days 1, 8, and 15  28 day cycle  Lyssa H, Raymundo K, Marcus Barraza E, et al; [employer] Study [facility]. Ramucirumab plus paclitaxel versus placebo plus paclitaxel in patients with previously treated advanced gastric or gastro-oesophageal junction adenocarcinoma ([place]): a double-blind, randomised phase 3 trial. Lancet Oncol. 2014;15(11):1527-0964.    Allergies: Review of patient's allergies indicates no known allergies.  /75 mmHg  Pulse 85  Temp(Src) 37.2 °C (99 °F)  Resp 20  Ht 1.5 m (4' 11.06\")  Wt 53.7 kg (118 lb 6.2 oz)  BMI 23.87 kg/m2  SpO2 96%  LMP 03/08/2009  Body surface area is 1.50 meters squared.    Labs 7/19/17  ANC ~3080 Plt = 272k   Hgb = 10.1    SCr = 0.6 mg/dL CrCl ~83 mL/min   AST/ALT/AP = WNL TBili = 0.3     Labs 7/12/17  Urine protein = negative  Urine HcG = 0.7 (=WNL)    Drug Order   (Drug name, dose, route, IV Fluid & volume, frequency, number of doses) Cycle: 2 Day 8  Previous treatment: C2D1 7/12/17     Medication = PACLItaxel  Base Dose= 80 mg/m2  Calc Dose: Base Dose x 1.5 m2 = 120 mg  Final Dose = 120 mg  Route = IV  Fluid & Volume =  mL  Admin Duration = Over 60 minutes             <5% difference, OK to treat with final dose          By my signature below, I confirm this process was performed independently with the BSA and all final chemotherapy dosing calculations congruent. I have reviewed the above chemotherapy order and that my calculation of the final dose and BSA (when applicable) corroborate those calculations of the  pharmacist. Discrepancies of 5% or greater in the written dose have been addressed and documented within the [facility] Progress notes.    Zak SchmidtD              "

## 2017-07-26 ENCOUNTER — OUTPATIENT INFUSION SERVICES (OUTPATIENT)
Dept: ONCOLOGY | Facility: MEDICAL CENTER | Age: 47
End: 2017-07-26
Attending: INTERNAL MEDICINE
Payer: MEDICAID

## 2017-07-26 VITALS
HEIGHT: 59 IN | OXYGEN SATURATION: 98 % | HEART RATE: 90 BPM | RESPIRATION RATE: 18 BRPM | DIASTOLIC BLOOD PRESSURE: 71 MMHG | SYSTOLIC BLOOD PRESSURE: 114 MMHG | WEIGHT: 116.18 LBS | TEMPERATURE: 98.6 F | BODY MASS INDEX: 23.42 KG/M2

## 2017-07-26 DIAGNOSIS — C16.9 METASTASIS FROM GASTRIC CANCER (HCC): ICD-10-CM

## 2017-07-26 DIAGNOSIS — C79.9 METASTASIS FROM GASTRIC CANCER (HCC): ICD-10-CM

## 2017-07-26 DIAGNOSIS — Z51.11 ENCOUNTER FOR ANTINEOPLASTIC CHEMOTHERAPY: ICD-10-CM

## 2017-07-26 DIAGNOSIS — E03.9 HYPOTHYROIDISM, UNSPECIFIED TYPE: ICD-10-CM

## 2017-07-26 LAB
ALBUMIN SERPL BCP-MCNC: 3.2 G/DL (ref 3.2–4.9)
ALBUMIN/GLOB SERPL: 1.2 G/DL
ALP SERPL-CCNC: 65 U/L (ref 30–99)
ALT SERPL-CCNC: 9 U/L (ref 2–50)
ANION GAP SERPL CALC-SCNC: 6 MMOL/L (ref 0–11.9)
APPEARANCE UR: ABNORMAL
AST SERPL-CCNC: 15 U/L (ref 12–45)
BACTERIA #/AREA URNS HPF: ABNORMAL /HPF
BASOPHILS # BLD AUTO: 0.8 % (ref 0–1.8)
BASOPHILS # BLD: 0.02 K/UL (ref 0–0.12)
BILIRUB SERPL-MCNC: 0.3 MG/DL (ref 0.1–1.5)
BILIRUB UR QL STRIP.AUTO: NEGATIVE
BUN SERPL-MCNC: 16 MG/DL (ref 8–22)
CALCIUM SERPL-MCNC: 8.7 MG/DL (ref 8.5–10.5)
CHLORIDE SERPL-SCNC: 107 MMOL/L (ref 96–112)
CO2 SERPL-SCNC: 25 MMOL/L (ref 20–33)
COLOR UR: ABNORMAL
CREAT SERPL-MCNC: 0.55 MG/DL (ref 0.5–1.4)
EOSINOPHIL # BLD AUTO: 0.04 K/UL (ref 0–0.51)
EOSINOPHIL NFR BLD: 1.6 % (ref 0–6.9)
EPI CELLS #/AREA URNS HPF: ABNORMAL /HPF
ERYTHROCYTE [DISTWIDTH] IN BLOOD BY AUTOMATED COUNT: 53.1 FL (ref 35.9–50)
GFR SERPL CREATININE-BSD FRML MDRD: >60 ML/MIN/1.73 M 2
GLOBULIN SER CALC-MCNC: 2.7 G/DL (ref 1.9–3.5)
GLUCOSE SERPL-MCNC: 102 MG/DL (ref 65–99)
GLUCOSE UR STRIP.AUTO-MCNC: NEGATIVE MG/DL
HCT VFR BLD AUTO: 26.7 % (ref 37–47)
HGB BLD-MCNC: 8.4 G/DL (ref 12–16)
HYALINE CASTS #/AREA URNS LPF: ABNORMAL /LPF
IMM GRANULOCYTES # BLD AUTO: 0.01 K/UL (ref 0–0.11)
IMM GRANULOCYTES NFR BLD AUTO: 0.4 % (ref 0–0.9)
KETONES UR STRIP.AUTO-MCNC: ABNORMAL MG/DL
LEUKOCYTE ESTERASE UR QL STRIP.AUTO: ABNORMAL
LYMPHOCYTES # BLD AUTO: 0.67 K/UL (ref 1–4.8)
LYMPHOCYTES NFR BLD: 27 % (ref 22–41)
MCH RBC QN AUTO: 27.1 PG (ref 27–33)
MCHC RBC AUTO-ENTMCNC: 31.5 G/DL (ref 33.6–35)
MCV RBC AUTO: 86.1 FL (ref 81.4–97.8)
MICRO URNS: ABNORMAL
MONOCYTES # BLD AUTO: 0.22 K/UL (ref 0–0.85)
MONOCYTES NFR BLD AUTO: 8.9 % (ref 0–13.4)
MUCOUS THREADS #/AREA URNS HPF: ABNORMAL /HPF
NEUTROPHILS # BLD AUTO: 1.52 K/UL (ref 2–7.15)
NEUTROPHILS NFR BLD: 61.3 % (ref 44–72)
NITRITE UR QL STRIP.AUTO: NEGATIVE
NRBC # BLD AUTO: 0 K/UL
NRBC BLD AUTO-RTO: 0 /100 WBC
PH UR STRIP.AUTO: 5 [PH]
PLATELET # BLD AUTO: 328 K/UL (ref 164–446)
PMV BLD AUTO: 10.1 FL (ref 9–12.9)
POTASSIUM SERPL-SCNC: 3.8 MMOL/L (ref 3.6–5.5)
PROT SERPL-MCNC: 5.9 G/DL (ref 6–8.2)
PROT UR QL STRIP: NEGATIVE MG/DL
RBC # BLD AUTO: 3.1 M/UL (ref 4.2–5.4)
RBC # URNS HPF: ABNORMAL /HPF
RBC UR QL AUTO: NEGATIVE
SODIUM SERPL-SCNC: 138 MMOL/L (ref 135–145)
SP GR UR STRIP.AUTO: 1.03
TSH SERPL DL<=0.005 MIU/L-ACNC: 0.76 UIU/ML (ref 0.3–3.7)
UROBILINOGEN UR STRIP.AUTO-MCNC: 1 MG/DL
WBC # BLD AUTO: 2.5 K/UL (ref 4.8–10.8)
WBC #/AREA URNS HPF: ABNORMAL /HPF

## 2017-07-26 PROCEDURE — A4212 NON CORING NEEDLE OR STYLET: HCPCS

## 2017-07-26 PROCEDURE — 700101 HCHG RX REV CODE 250

## 2017-07-26 PROCEDURE — 304540 HCHG NITRO SET VENT 2ND TUB

## 2017-07-26 PROCEDURE — 700105 HCHG RX REV CODE 258: Performed by: INTERNAL MEDICINE

## 2017-07-26 PROCEDURE — 80053 COMPREHEN METABOLIC PANEL: CPT

## 2017-07-26 PROCEDURE — 96417 CHEMO IV INFUS EACH ADDL SEQ: CPT

## 2017-07-26 PROCEDURE — 81001 URINALYSIS AUTO W/SCOPE: CPT

## 2017-07-26 PROCEDURE — 700111 HCHG RX REV CODE 636 W/ 250 OVERRIDE (IP)

## 2017-07-26 PROCEDURE — 96375 TX/PRO/DX INJ NEW DRUG ADDON: CPT

## 2017-07-26 PROCEDURE — A9270 NON-COVERED ITEM OR SERVICE: HCPCS | Performed by: INTERNAL MEDICINE

## 2017-07-26 PROCEDURE — 85025 COMPLETE CBC W/AUTO DIFF WBC: CPT

## 2017-07-26 PROCEDURE — 84443 ASSAY THYROID STIM HORMONE: CPT

## 2017-07-26 PROCEDURE — 700102 HCHG RX REV CODE 250 W/ 637 OVERRIDE(OP): Performed by: INTERNAL MEDICINE

## 2017-07-26 PROCEDURE — 96413 CHEMO IV INFUSION 1 HR: CPT

## 2017-07-26 PROCEDURE — 700111 HCHG RX REV CODE 636 W/ 250 OVERRIDE (IP): Performed by: INTERNAL MEDICINE

## 2017-07-26 RX ORDER — DIPHENHYDRAMINE HCL 25 MG
50 TABLET ORAL ONCE
Status: COMPLETED | OUTPATIENT
Start: 2017-07-26 | End: 2017-07-26

## 2017-07-26 RX ORDER — LIDOCAINE HYDROCHLORIDE 10 MG/ML
INJECTION, SOLUTION INFILTRATION; PERINEURAL
Status: COMPLETED
Start: 2017-07-26 | End: 2017-07-26

## 2017-07-26 RX ORDER — ACETAMINOPHEN 325 MG/1
650 TABLET ORAL ONCE
Status: COMPLETED | OUTPATIENT
Start: 2017-07-26 | End: 2017-07-26

## 2017-07-26 RX ORDER — LORAZEPAM 1 MG/1
0.5 TABLET ORAL ONCE
Status: COMPLETED | OUTPATIENT
Start: 2017-07-26 | End: 2017-07-26

## 2017-07-26 RX ADMIN — HEPARIN 500 UNITS: 100 SYRINGE at 18:05

## 2017-07-26 RX ADMIN — SODIUM CHLORIDE: 9 INJECTION, SOLUTION INTRAVENOUS at 15:10

## 2017-07-26 RX ADMIN — ONDANSETRON HYDROCHLORIDE 16 MG: 2 SOLUTION INTRAMUSCULAR; INTRAVENOUS at 14:15

## 2017-07-26 RX ADMIN — LIDOCAINE HYDROCHLORIDE: 10 INJECTION, SOLUTION INFILTRATION; PERINEURAL at 11:45

## 2017-07-26 RX ADMIN — FAMOTIDINE 20 MG: 10 INJECTION, SOLUTION INTRAVENOUS at 14:07

## 2017-07-26 RX ADMIN — LORAZEPAM 0.5 MG: 1 TABLET ORAL at 13:45

## 2017-07-26 RX ADMIN — PACLITAXEL 120 MG: 6 INJECTION, SOLUTION, CONCENTRATE INTRAVENOUS at 16:45

## 2017-07-26 RX ADMIN — ACETAMINOPHEN 650 MG: 325 TABLET, FILM COATED ORAL at 14:05

## 2017-07-26 RX ADMIN — DIPHENHYDRAMINE HCL 50 MG: 25 TABLET ORAL at 14:05

## 2017-07-26 RX ADMIN — DEXAMETHASONE SODIUM PHOSPHATE 12 MG: 4 INJECTION, SOLUTION INTRAMUSCULAR; INTRAVENOUS at 14:30

## 2017-07-26 ASSESSMENT — PAIN SCALES - GENERAL: PAINLEVEL: 2=MINIMAL-SLIGHT

## 2017-07-26 NOTE — PROGRESS NOTES
Chemotherapy Verification - SECONDARY RN       Height = 150 cm  Weight = 52.7 kg  BSA = 1.48 m2       Medication: Taxol  Dose: 80 mg/m2  Calculated Dose: 118.4 mg                            (In mg/m2, AUC, mg/kg)     Medication: Cyramza  Dose: 8 mg/kg  Calculated Dose: 421.6 mg                            (In mg/m2, AUC, mg/kg)      I confirm that this process was performed independently.

## 2017-07-26 NOTE — PROGRESS NOTES
"Pharmacy Chemotherapy Verification    Patient Name: Melita Herrera     Dx: metastatic gastric cancer    Cycle: 2 Day 15   Previous treatment:C2D8 on 7/19/17     Protocol: Cyramza + Taxol  Ramucirumab (Cyramza) 8 mg/kg IV on Days 1 and 15  Paclitaxel (Taxol) 80 mg/m2 IV on Days 1, 8, and 15  28-day cycle    Lyssa JOYA, et al. Ramucirumab plus paclitaxel versus placebo plus paclitaxel in patients with previously treated advanced gastric or gastro-oesophageal junction adenocarcinoma (RAINBOW): a double-blind, randomised phase 3 trial. Lancet Oncol. 2014 Oct;15(11):1224-35.      Allergies: Review of patient's allergies indicates no known allergies.  /71 mmHg  Pulse 90  Temp(Src) 37 °C (98.6 °F)  Resp 18  Ht 1.5 m (4' 11.06\")  Wt 52.7 kg (116 lb 2.9 oz)  BMI 23.42 kg/m2  SpO2 98%  LMP 03/08/2009 Body surface area is 1.48 meters squared.    Labs 7/26/17   ANC~ 1520 Plt = 328 k   Hgb = 8.4     SCr = 0.55 mg/dL CrCl ~ 81.2 mL/min   AST/ALT/AP = 15/9/65 TBili = 0.3  TSH = 0.76  Urine protein = negative   Labs 7/12/17     BHCG = 0.7    Ramucirumab (Cyramza) 8 mg/kg x 52.7 kg = 421.6 mg              <5% difference, OK to treat with final written dose = 440 mg IV     Paclitaxel (Taxol) 80 mg/m² x 1.48 m² = 118.4 mg   <5% difference, OK to treat with final written dose = 120 mg IV    Mansi Robles, PharmD                     "

## 2017-07-26 NOTE — PROGRESS NOTES
Chemotherapy Verification - PRIMARY RN      Height = 59 in  Weight = 116 lb  BSA = 1.48 m2       Medication: Taxol  Dose: 80 mg/m2  Calculated Dose: 118.4 mg                               (In mg/m2, AUC, mg/kg)    Medication: Cyramza Dose: 8mg/kg Calculated Dose: 421.6 mg    I confirm this process was performed independently with the BSA and all final chemotherapy dosing calculations congruent.  Any discrepancies of 5% or greater have been addressed with the chemotherapy pharmacist. The resolution of the discrepancy has been documented in the EPIC progress notes.

## 2017-07-26 NOTE — PROGRESS NOTES
"Pharmacy Chemotherapy Verification    Patient Name: Melita Herrera  Dx: Metastatic gastric carcinoma    Protocol: Ramucirumab + Paclitaxel    Ramucirumab 8 mg/kg IV on days 1 and 15  Paclitaxel 80 mg/m2 IV on days 1, 8, and 15  28 day cycle  Lyssa H, Raymundo K, Marcus Barraza E, et al; [employer] Study Group. Ramucirumab plus paclitaxel versus placebo plus paclitaxel in patients with previously treated advanced gastric or gastro-oesophageal junction adenocarcinoma ([place]): a double-blind, randomised phase 3 trial. Lancet Oncol. 2014;15(11):2867-0728.    Allergies: Review of patient's allergies indicates no known allergies.  /71 mmHg  Pulse 90  Temp(Src) 37 °C (98.6 °F)  Resp 18  Ht 1.5 m (4' 11.06\")  Wt 52.7 kg (116 lb 2.9 oz)  BMI 23.42 kg/m2  SpO2 98%  LMP 03/08/2009  Body surface area is 1.48 meters squared.    Labs 7/26/17  ANC ~1520 Plt = 328k   Hgb = 8.4  SCr = 0.55 mg/dL CrCl ~82 mL/min  (min Cr 0.7)  AST/ALT/AP = WNL TBili = 0.3 TSH = 0.76    Urine protein = negative    Drug Order   (Drug name, dose, route, IV Fluid & volume, frequency, number of doses) Cycle: 2 Day 15  Previous treatment: C2D8 = 7/19/17     Medication = Ramucirumab  Base Dose= 8 mg/kg  Calc Dose: Base Dose x 52.7 kg = 422 mg  Final Dose = 440 mg  Route = IV  Fluid & Volume =  mL  Admin Duration = Over 60 minutes   Administer first          <5% difference, OK to treat with final dose      Medication = PACLItaxel  Base Dose= 80 mg/m2  Calc Dose: Base Dose x 1.48 m2 = 118 mg  Final Dose = 120 mg  Route = 120 mg  Fluid & Volume =  mL  Admin Duration = Over 60 minutes             <5% difference, OK to treat with final dose        By my signature below, I confirm this process was performed independently with the BSA and all final chemotherapy dosing calculations congruent. I have reviewed the above chemotherapy order and that my calculation of the final dose and BSA (when applicable) corroborate those calculations of " the  pharmacist. Discrepancies of 5% or greater in the written dose have been addressed and documented within the EPIC Progress notes.    Zak EngelD

## 2017-07-26 NOTE — PROGRESS NOTES
Mrs Reyes into [facility] for Day 15 / Cycle 2 of Taxol for Metastatic Gastric Cancer. Patient in good spirits today, denies suicidal ideation. Pt denied having any new or acute complaints today, reports tolerating past treatments well. (Port accessed in a sterile manner, had + blood return, flushed briskly.) Pt given premedications and chemotherapy as prescribed, tolerated well, denied having any complaints during or after infusion. (Port had + blood return after, flushed per [facility] policy, de-accessed, needle intact, insertion site covered with sterile gauze and paper tape.) Discharged home to self care in [facility]. Appointment confirmed for next treatment.

## 2017-07-28 ENCOUNTER — TELEPHONE (OUTPATIENT)
Dept: HEMATOLOGY ONCOLOGY | Facility: MEDICAL CENTER | Age: 47
End: 2017-07-28

## 2017-07-28 DIAGNOSIS — C16.9: ICD-10-CM

## 2017-07-28 RX ORDER — LORAZEPAM 0.5 MG/1
0.5 TABLET ORAL EVERY 8 HOURS PRN
Qty: 5 TAB | Refills: 0 | OUTPATIENT
Start: 2017-07-28

## 2017-07-28 RX ORDER — OXYCODONE HYDROCHLORIDE AND ACETAMINOPHEN 5; 325 MG/1; MG/1
1-2 TABLET ORAL EVERY 4 HOURS PRN
Qty: 10 TAB | Refills: 0 | Status: SHIPPED | OUTPATIENT
Start: 2017-07-28 | End: 2017-08-10 | Stop reason: SDUPTHER

## 2017-07-28 RX ORDER — LORAZEPAM 0.5 MG/1
0.5 TABLET ORAL EVERY 8 HOURS PRN
Qty: 5 TAB | Refills: 0 | Status: SHIPPED | OUTPATIENT
Start: 2017-07-28 | End: 2017-08-10 | Stop reason: SDUPTHER

## 2017-07-28 RX ORDER — OXYCODONE HYDROCHLORIDE AND ACETAMINOPHEN 5; 325 MG/1; MG/1
1-2 TABLET ORAL EVERY 4 HOURS PRN
Qty: 10 TAB | Refills: 0 | OUTPATIENT
Start: 2017-07-28

## 2017-07-28 NOTE — TELEPHONE ENCOUNTER
Called patient to inform her that her prescription for Percocet 5-325 MG is ready to be picked up as well as her Ativan 0.5 MG

## 2017-08-07 ENCOUNTER — HOSPITAL ENCOUNTER (OUTPATIENT)
Dept: RADIOLOGY | Facility: MEDICAL CENTER | Age: 47
End: 2017-08-07
Attending: NURSE PRACTITIONER
Payer: MEDICAID

## 2017-08-07 DIAGNOSIS — C79.9 METASTASIS FROM GASTRIC CANCER (HCC): ICD-10-CM

## 2017-08-07 DIAGNOSIS — Z51.11 ENCOUNTER FOR ANTINEOPLASTIC CHEMOTHERAPY: ICD-10-CM

## 2017-08-07 DIAGNOSIS — C16.9 METASTASIS FROM GASTRIC CANCER (HCC): ICD-10-CM

## 2017-08-07 PROCEDURE — 71260 CT THORAX DX C+: CPT

## 2017-08-08 ENCOUNTER — HOSPITAL ENCOUNTER (OUTPATIENT)
Facility: MEDICAL CENTER | Age: 47
End: 2017-08-08
Attending: INTERNAL MEDICINE
Payer: MEDICAID

## 2017-08-08 ENCOUNTER — NON-PROVIDER VISIT (OUTPATIENT)
Dept: HEMATOLOGY ONCOLOGY | Facility: MEDICAL CENTER | Age: 47
End: 2017-08-08
Payer: MEDICAID

## 2017-08-08 ENCOUNTER — TELEPHONE (OUTPATIENT)
Dept: HEMATOLOGY ONCOLOGY | Facility: MEDICAL CENTER | Age: 47
End: 2017-08-08

## 2017-08-08 ENCOUNTER — OFFICE VISIT (OUTPATIENT)
Dept: HEMATOLOGY ONCOLOGY | Facility: MEDICAL CENTER | Age: 47
End: 2017-08-08
Payer: MEDICAID

## 2017-08-08 VITALS
RESPIRATION RATE: 16 BRPM | SYSTOLIC BLOOD PRESSURE: 138 MMHG | WEIGHT: 117 LBS | DIASTOLIC BLOOD PRESSURE: 90 MMHG | OXYGEN SATURATION: 96 % | HEIGHT: 59 IN | HEART RATE: 88 BPM | BODY MASS INDEX: 23.59 KG/M2 | TEMPERATURE: 100 F

## 2017-08-08 VITALS
WEIGHT: 117 LBS | RESPIRATION RATE: 16 BRPM | HEART RATE: 69 BPM | TEMPERATURE: 100.2 F | DIASTOLIC BLOOD PRESSURE: 80 MMHG | HEIGHT: 59 IN | BODY MASS INDEX: 23.59 KG/M2 | OXYGEN SATURATION: 96 % | SYSTOLIC BLOOD PRESSURE: 118 MMHG

## 2017-08-08 VITALS
HEIGHT: 59 IN | BODY MASS INDEX: 23.62 KG/M2 | OXYGEN SATURATION: 96 % | WEIGHT: 117.17 LBS | SYSTOLIC BLOOD PRESSURE: 138 MMHG | TEMPERATURE: 100 F | RESPIRATION RATE: 16 BRPM | HEART RATE: 88 BPM | DIASTOLIC BLOOD PRESSURE: 90 MMHG

## 2017-08-08 DIAGNOSIS — C16.9 METASTASIS FROM GASTRIC CANCER (HCC): ICD-10-CM

## 2017-08-08 DIAGNOSIS — C16.9: ICD-10-CM

## 2017-08-08 DIAGNOSIS — C79.9 METASTASIS FROM GASTRIC CANCER (HCC): ICD-10-CM

## 2017-08-08 PROCEDURE — 99214 OFFICE O/P EST MOD 30 MIN: CPT | Performed by: INTERNAL MEDICINE

## 2017-08-08 PROCEDURE — 36415 COLL VENOUS BLD VENIPUNCTURE: CPT | Performed by: INTERNAL MEDICINE

## 2017-08-08 PROCEDURE — 36591 DRAW BLOOD OFF VENOUS DEVICE: CPT | Performed by: INTERNAL MEDICINE

## 2017-08-08 RX ORDER — MORPHINE SULFATE 30 MG/1
30 TABLET, FILM COATED, EXTENDED RELEASE ORAL EVERY 12 HOURS
Qty: 60 TAB | Refills: 0 | Status: SHIPPED | OUTPATIENT
Start: 2017-08-08 | End: 2017-08-10

## 2017-08-08 ASSESSMENT — PAIN SCALES - GENERAL
PAINLEVEL: 8=MODERATE-SEVERE PAIN

## 2017-08-08 NOTE — NON-PROVIDER
Received verbal orders from Dr. Medrano to give 500ml NS bolus.  Discussed with patient and she is in agreement to use her port for hydration.  Patient requested Lidocaine injection to port-a-cath site.  Cleansed site with chloraprep and injected .5ml lidocaine intradermally to port-a-cath site per protocol prior to access.  Port accessed in sterile fashion; brisk blood return verified.  Infused NS bolus at 500ml/hr.  VSS. Patient tolerated well.  Port flushed with 20ml NS and 5ml heparin per protocol.  Port deaccessed and site covered with sterile gauze and tape.  Patient tolerated well.

## 2017-08-08 NOTE — MR AVS SNAPSHOT
"        Melita ColeThang   2017 8:30 AM   Non-Provider Visit   MRN: [id number]    Department:  [facility]   Dept Phone:  [phone]    Description:  Female : 1970   Provider:  ONC MA 1           Reason for Visit     Other labs       Allergies as of 2017     No Known Allergies      Vital Signs     Blood Pressure Pulse Temperature Respirations Height Weight    138/90 mmHg 88 37.8 °C (100 °F) 16 1.5 m (4' 11.06\") 53.15 kg (117 lb 2.8 oz)    Body Mass Index Oxygen Saturation Last Menstrual Period Smoking Status          23.62 kg/m2 96% 2009 Never Smoker         Basic Information     Date Of Birth Sex Race Ethnicity Preferred Language Language for Written Material    1970 Female  or   Origin (Niuean,Armenian,Indonesian,Hong Konger, etc) Niuean Niuean      Your appointments     Aug 08, 2017  9:40 AM   ONCOLOGY EST PATIENT 30 MIN with Ramana Medrano M.D.   [facility] (--)    [address], [address]  [facility] [address]   [phone]            Aug 09, 2017  9:30 AM   Est Chemo 3 with RN 5   Infusion Services ([facility])    [id number]  [facility] [address]   [phone]            Aug 16, 2017  9:30 AM   Est Chemo 3 with RN 5   In[facility] ([facility])    [id number]  [facility] [address]   [phone]            Aug 23, 2017  9:30 AM   Est Chemo 3 with RN 5   Infusion [facility] ([facility])    [address] [address]   [phone]              Problem List              ICD-10-CM Priority Class Noted - Resolved    Metastasis from gastric cancer (CMS-HCC) C79.9, C16.9   2017 - Present      Health Maintenance        Date Due Completion Dates    IMM DTaP/Tdap/Td Vaccine (1 - Tdap) 1987 ---    PAP SMEAR 1989 ---    MAMMOGRAM 2008 ---    IMM INFLUENZA (1) 2017 ---            Current Immunizations     No immunizations on file.      Below and/or attached are the medications your " provider expects you to take. Review all of your home medications and newly ordered medications with your provider and/or pharmacist. Follow medication instructions as directed by your provider and/or pharmacist. Please keep your medication list with you and share with your provider. Update the information when medications are discontinued, doses are changed, or new medications (including over-the-counter products) are added; and carry medication information at all times in the event of emergency situations     Allergies:  No Known Allergies          Medications  Valid as of: August 08, 2017 -  8:49 AM    Generic Name Brand Name Tablet Size Instructions for use    Folic Acid   Take  by mouth.        LORazepam (Tab) ATIVAN 0.5 MG Take 1 Tab by mouth every 8 hours as needed for Anxiety for up to 5 doses.        Ondansetron HCl (Tab) ZOFRAN 4 MG Take 1 Tab by mouth every four hours as needed for Nausea/Vomiting.        Oxycodone-Acetaminophen (Tab) PERCOCET 5-325 MG Take 1-2 Tabs by mouth every four hours as needed.        Prochlorperazine Maleate (Tab) COMPAZINE 10 MG Take 1 Tab by mouth every 6 hours as needed.        .                 Medicines prescribed today were sent to:     [id number] [place] - [address]    [address] [address]    Phone: [phone] Fax: [phone]    Open 24 Hours?: No      Medication refill instructions:       If your prescription bottle indicates you have medication refills left, it is not necessary to call your provider’s office. Please contact your pharmacy and they will refill your medication.    If your prescription bottle indicates you do not have any refills left, you may request refills at any time through one of the following ways: The online [employer] system (except Urgent Care), by calling your provider’s office, or by asking your pharmacy to contact your provider’s office with a refill request. Medication refills are processed only during regular business  hours and may not be available until the next business day. Your provider may request additional information or to have a follow-up visit with you prior to refilling your medication.   *Please Note: Medication refills are assigned a new Rx number when refilled electronically. Your pharmacy may indicate that no refills were authorized even though a new prescription for the same medication is available at the pharmacy. Please request the medicine by name with the pharmacy before contacting your provider for a refill.           MyChart Status: Patient Declined

## 2017-08-08 NOTE — PROGRESS NOTES
Date of visit: 8/8/2017  8:58 AM      Chief Complaint- metastatic gastric carcinoma. HER-2/elena negative,MSI-stable, low tumor mutational burden      History of presenting illness: Melita Herrera  is a 46 y.o. year old female who is here for follow-up of metastatic gastric carcinoma. She presented with epigastric pain and was found to have an adenocarcinomaof the greater curvature of the stomach measuring 4 x 3.8 cm on CT scan 8/15/16. The biopsy showed  adenocarcinoma with signet ring cells.     She was seen by Dr. Jordan who started neoadjuvant chemotherapy with 3 cycles of TFOX with the last one given at [facility] on 10/27/16. Subsequent CT scan of the abdomen  on 10/20/60 which showed the 8 x 3 cm density on the ventral margin of the greater curvature of the stomach was relatively stable.     She was to be referred to her surgeon at [facility] who felt that Dr. Frederick would be a better option for her surgery and the patient was to have been called in surgery to be arranged. Unfortunately, this had not happened and she went to the emergency room at [facility] on 12/23/16 with epigastric pain and evaluation was negative.    Unfortunately, there was some delay prior to her seeing Dr. Tyler. On, 1/25/17, the laparoscopic evaluation showed peritoneal spread and she was deemed inoperable. She received 2 more doses of TFOX. 4/19/17CT abdomen showed significant interval enlargement of irregular mass abutting the gastric wall and extending into the omentum with significant interval worsening of diffuse omental metastasis. Tiny 3 mm hypodense liver lesion possibly a new metastases along with a new 4 mm right lower lobe pulmonary nodule.  Foundation one testing showed p53 mutation and a few other mutation with no actionable mutations found.She had low tumor mutational burden.     5/2017-established care here and finally started Taxol and  Ramucirumab through assistance with  reasonably good tolerance. Pretreatment CT scan showed significant disease progression with a new omental mass,  2.2 cm liver mass, new thoracolumbar spine metastatic disease. I nodule involving the right lower lobe of the lung and left breast .    She did develop some cytopenias. Her tumor marker  improved to 12 from pretreatment value of 92.    She is status post 3 cycles. Restaging CT scan done on 8/7/2017 shows slightly less prominent gastric wall thickening and omental mass. Pelvic deferred, 2 cm liver mass is stable. Her spine metastatic disease is also stable.    She presents to clinic for follow-up. She had significant decline in her performance status. She has been having worsening abdominal pain. She complains of feeling dizzy and very fatigued over the past few weeks. She never called our clinic. Denies any active bleed. She has some subjective fever. No active bleeding Past Medical History:      Past Medical History   Diagnosis Date   • Metastasis from gastric cancer (CMS-HCC) 5/19/2017       Past surgical history:     No past surgical history on file.    Allergies:       Review of patient's allergies indicates no known allergies.    Medications:         Current Outpatient Prescriptions   Medication Sig Dispense Refill   • morphine ER (MS CONTIN) 30 MG Tab CR tablet Take 1 Tab by mouth every 12 hours. 60 Tab 0   • lorazepam (ATIVAN) 0.5 MG Tab Take 1 Tab by mouth every 8 hours as needed for Anxiety for up to 5 doses. 5 Tab 0   • oxycodone-acetaminophen (PERCOCET) 5-325 MG Tab Take 1-2 Tabs by mouth every four hours as needed. 10 Tab 0   • ondansetron (ZOFRAN) 4 MG Tab tablet Take 1 Tab by mouth every four hours as needed for Nausea/Vomiting. 30 Tab 3   • FOLIC ACID PO Take  by mouth.     • prochlorperazine (COMPAZINE) 10 MG Tab Take 1 Tab by mouth every 6 hours as needed. 30 Tab 3     No current facility-administered medications for this visit.         Social History:     Social History     Social  "History   • Marital Status: Single     Spouse Name: N/A   • Number of Children: N/A   • Years of Education: N/A     Occupational History   • Not on file.     Social History Main Topics   • Smoking status: Never Smoker    • Smokeless tobacco: Never Used   • Alcohol Use: No   • Drug Use: No   • Sexual Activity: Not on file     Other Topics Concern   • Not on file     Social History Narrative       Family History:    No family history on file.    Review of Systems:  All other review of systems are negative except what was mentioned above in the HPI.    Constitutional: Negative for fever, chills, positive for weight loss and malaise/fatigue.    HEENT: No new auditory or visual complaints. No sore throat and neck pain.     Respiratory: Negative for cough, sputum production, shortness of breath and wheezing.    Cardiovascular: Negative for chest pain, palpitations, orthopnea and leg swelling.    Gastrointestinal: Negative for heartburn, nausea, vomiting. Positive for abdominal pain.    Genitourinary: Negative for dysuria, hematuria    Musculoskeletal: No new arthralgias or myalgias . She has occasional back pain  Skin: Negative for rash and itching.    Neurological: Negative for focal weakness and headaches.    Endo/Heme/Allergies: No abnormal bleed/bruise.    Psychiatric/Behavioral: No new depression/anxiety.    Physical Exam:  Vitals: /90 mmHg  Pulse 88  Temp(Src) 37.8 °C (100 °F)  Resp 16  Ht 1.499 m (4' 11\")  Wt 53.071 kg (117 lb)  BMI 23.62 kg/m2  SpO2 96%  LMP 03/08/2009    General: Not in acute distress, alert and oriented x 3, appears fatigued  HEENT: No pallor, icterus. Oropharynx clear.   Neck: Supple, no palpable masses.  Lymph nodes: No palpable cervical, supraclavicular, axillary or inguinal lymphadenopathy.    CVS: regular rate and rhythm, no rubs or gallops  RESP: Clear to auscultate bilaterally, no wheezing or crackles.   ABD: She has generalized tenderness in her epigastric region secondary " to omental disease non distended, positive bowel sounds, no palpable organomegaly  EXT: No edema or cyanosis  CNS: Alert and oriented x3, No focal deficits.  Skin- No rash      Labs:   No visits with results within 1 Week(s) from this visit.  Latest known visit with results is:    Outpatient [facility] on 07/26/2017   Component Date Value Ref Range Status   • WBC 07/26/2017 2.5* 4.8 - 10.8 K/uL Final    Comment: This result has been called to [id number] by Kennedi Restrepo on 07 26 2017 at  1336, and has been read back.     • RBC 07/26/2017 3.10* 4.20 - 5.40 M/uL Final   • Hemoglobin 07/26/2017 8.4* 12.0 - 16.0 g/dL Final   • Hematocrit 07/26/2017 26.7* 37.0 - 47.0 % Final   • MCV 07/26/2017 86.1  81.4 - 97.8 fL Final   • MCH 07/26/2017 27.1  27.0 - 33.0 pg Final   • MCHC 07/26/2017 31.5* 33.6 - 35.0 g/dL Final   • RDW 07/26/2017 53.1* 35.9 - 50.0 fL Final   • Platelet Count 07/26/2017 328  164 - 446 K/uL Final   • MPV 07/26/2017 10.1  9.0 - 12.9 fL Final   • Neutrophils-Polys 07/26/2017 61.30  44.00 - 72.00 % Final   • Lymphocytes 07/26/2017 27.00  22.00 - 41.00 % Final   • Monocytes 07/26/2017 8.90  0.00 - 13.40 % Final   • Eosinophils 07/26/2017 1.60  0.00 - 6.90 % Final   • Basophils 07/26/2017 0.80  0.00 - 1.80 % Final   • Immature Granulocytes 07/26/2017 0.40  0.00 - 0.90 % Final   • Nucleated RBC 07/26/2017 0.00   Final   • Neutrophils (Absolute) 07/26/2017 1.52* 2.00 - 7.15 K/uL Final    Includes immature neutrophils, if present.   • Lymphs (Absolute) 07/26/2017 0.67* 1.00 - 4.80 K/uL Final   • Monos (Absolute) 07/26/2017 0.22  0.00 - 0.85 K/uL Final   • Eos (Absolute) 07/26/2017 0.04  0.00 - 0.51 K/uL Final   • Baso (Absolute) 07/26/2017 0.02  0.00 - 0.12 K/uL Final   • Immature Granulocytes (abs) 07/26/2017 0.01  0.00 - 0.11 K/uL Final   • NRBC (Absolute) 07/26/2017 0.00   Final   • Sodium 07/26/2017 138  135 - 145 mmol/L Final   • Potassium 07/26/2017 3.8  3.6 - 5.5 mmol/L Final   • Chloride 07/26/2017  107  96 - 112 mmol/L Final   • Co2 07/26/2017 25  20 - 33 mmol/L Final   • Anion Gap 07/26/2017 6.0  0.0 - 11.9 Final   • Glucose 07/26/2017 102* 65 - 99 mg/dL Final   • Bun 07/26/2017 16  8 - 22 mg/dL Final   • Creatinine 07/26/2017 0.55  0.50 - 1.40 mg/dL Final   • Calcium 07/26/2017 8.7  8.5 - 10.5 mg/dL Final   • AST(SGOT) 07/26/2017 15  12 - 45 U/L Final   • ALT(SGPT) 07/26/2017 9  2 - 50 U/L Final   • Alkaline Phosphatase 07/26/2017 65  30 - 99 U/L Final   • Total Bilirubin 07/26/2017 0.3  0.1 - 1.5 mg/dL Final   • Albumin 07/26/2017 3.2  3.2 - 4.9 g/dL Final   • Total Protein 07/26/2017 5.9* 6.0 - 8.2 g/dL Final   • Globulin 07/26/2017 2.7  1.9 - 3.5 g/dL Final   • A-G Ratio 07/26/2017 1.2   Final   • TSH 07/26/2017 0.760  0.300 - 3.700 uIU/mL Final   • Color 07/26/2017 DK Yellow   Final   • Character 07/26/2017 Cloudy*  Final   • Specific Gravity 07/26/2017 1.032  <1.035 Final   • Ph 07/26/2017 5.0  5.0-8.0 Final   • Glucose 07/26/2017 Negative  Negative mg/dL Final   • Ketones 07/26/2017 Trace* Negative mg/dL Final   • Protein 07/26/2017 Negative  Negative mg/dL Final   • Bilirubin 07/26/2017 Negative  Negative Final   • Urobilinogen, Urine 07/26/2017 1.0  Negative Final   • Nitrite 07/26/2017 Negative  Negative Final   • Leukocyte Esterase 07/26/2017 Moderate* Negative Final   • Occult Blood 07/26/2017 Negative  Negative Final   • Micro Urine Req 07/26/2017 Microscopic   Final   • WBC 07/26/2017 *  Final    Comment: Female  <12 Yr 0-2  >12 Yr 0-5  Male   None     • RBC 07/26/2017 0-2   Final    Comment: Female  >12 Yr 0-2  Male   None     • Bacteria 07/26/2017 Moderate* None /hpf Final   • Epithelial Cells 07/26/2017 Moderate*  Final   • Mucous Threads 07/26/2017 Many   Final   • Hyaline Cast 07/26/2017 0-2   Final   • GFR If  07/26/2017 >60  >60 mL/min/1.73 m 2 Final   • GFR If Non  07/26/2017 >60  >60 mL/min/1.73 m 2 Final             Assessment and Plan:  Metastatic  gastric carcinoma-she was treated aggressively with Taxotere, 5-FU and oxaliplatin without major response. Unfortunately, there was some delay in her getting surgical evaluation early this year. She was deemed inoperable and had 2 more cycles of TFOX with further studies showing progression.     She tolerated the Taxol and a Ramucirumab reasonably well. However, over the past few weeks. She has decline in her performance status. Her last CBC shows some worsening anemia. She denies any obvious bleeding.    Restaging CT scan shows stable /mild improvement in her disease. I reviewed the CT images with the patient. She has been having significant fatigue issues. I will make arrangements for hydration. Unfortunately, her labs had to be sent to [email or web address] and we are awaiting the results of this.    A long discussion for 40 minutes with the patient today. Informed her that the prognosis is overall poor . Given the chemotherapy resistance and signet ringed morphology. She had microsatellite stable pattern with low tumor mutational  [place] and Pembrolizumab may not be effective.    I also discussed the option of her ending up needing palliative care/hospice if she develop intolerance to treatment or if she continues to have progression.    We will follow up on the outside labs and see whether she needs any transfusion. Instructed her to go to emergency room if she develops any significant deterioration of her symptoms. We will hold off on chemotherapy this week and see her back in 2 weeks.    Worsening abdominal pain- secondary to metastatic disease. I will start her on MS Contin twice a day for better pain control. She does not appear to have significant pain from the spine metastases.        She agreed and verbalized her agreement and understanding with the current plan.  I answered all questions and concerns she has at this time         Please note that this dictation was created using voice recognition software. I have made  every reasonable attempt to correct obvious errors, but I expect that there are errors of grammar and possibly content that I did not discover before finalizing the note.      SIGNATURES:  Ramana Medrano    CC:  HUY Pan  No ref. provider found

## 2017-08-08 NOTE — MR AVS SNAPSHOT
"        Melita ColeThang   2017 9:40 AM   Office Visit   MRN: [id number]    Department:  [facility]   Dept Phone:  [phone]    Description:  Female : 1970   Provider:  Ramana Medrano M.D.           Reason for Visit     Follow-Up Prechemo      Allergies as of 2017     No Known Allergies      You were diagnosed with     Metastasis from gastric cancer (CMS-HCC)   [[id number]]         Vital Signs     Blood Pressure Pulse Temperature Respirations Height Weight    138/90 mmHg 88 37.8 °C (100 °F) 16 1.499 m (4' 11\") 53.071 kg (117 lb)    Body Mass Index Oxygen Saturation Last Menstrual Period Smoking Status          23.62 kg/m2 96% 2009 Never Smoker         Basic Information     Date Of Birth Sex Race Ethnicity Preferred Language Language for Written Material    1970 Female  or   Origin (Romansh,[place]an,Romanian,[place]ican, etc) Romansh Romansh      Your appointments     Aug 08, 2017 10:00 AM   Non Provider 1 with ONC RN 2   [facility] (--)    [address] [place] [address]  [facility] [address] [phone]           You will be receiving a confirmation call a few days before your appointment from our automated call confirmation system.            Aug 09, 2017  9:30 AM   Est Chemo 3 with RN 5   Infusion Services ([facility])    [address]  [facility] [id number] [phone]            Aug 15, 2017 10:40 AM   ONCOLOGY EST PATIENT 30 MIN with Ramana Medrano M.D.   [facility] (--)    [address] [id number] [phone]            Aug 16, 2017  9:30 AM   Est Chemo 3 with RN 5   Infusion Services ([facility])    [address]  [facility] [id number] [phone]            Aug 23, 2017  9:30 AM   Est Chemo 3 with RN 5   Infusion Services ([facility])    [address]  [facility] [id number] [phone]              Problem List              ICD-10-CM Priority Class Noted - Resolved   " Metastasis from gastric cancer (CMS-HCC) C79.9, C16.9   5/19/2017 - Present      Health Maintenance        Date Due Completion Dates    IMM DTaP/Tdap/Td Vaccine (1 - Tdap) 12/11/1987 ---    PAP SMEAR 12/11/1989 ---    MAMMOGRAM 12/11/2008 ---    IMM INFLUENZA (1) 9/1/2017 ---            Current Immunizations     No immunizations on file.      Below and/or attached are the medications your provider expects you to take. Review all of your home medications and newly ordered medications with your provider and/or pharmacist. Follow medication instructions as directed by your provider and/or pharmacist. Please keep your medication list with you and share with your provider. Update the information when medications are discontinued, doses are changed, or new medications (including over-the-counter products) are added; and carry medication information at all times in the event of emergency situations     Allergies:  No Known Allergies          Medications  Valid as of: August 08, 2017 -  9:58 AM    Generic Name Brand Name Tablet Size Instructions for use    Folic Acid   Take  by mouth.        LORazepam (Tab) ATIVAN 0.5 MG Take 1 Tab by mouth every 8 hours as needed for Anxiety for up to 5 doses.        Morphine Sulfate (Tab CR) MS CONTIN 30 MG Take 1 Tab by mouth every 12 hours.        Ondansetron HCl (Tab) ZOFRAN 4 MG Take 1 Tab by mouth every four hours as needed for Nausea/Vomiting.        Oxycodone-Acetaminophen (Tab) PERCOCET 5-325 MG Take 1-2 Tabs by mouth every four hours as needed.        Prochlorperazine Maleate (Tab) COMPAZINE 10 MG Take 1 Tab by mouth every 6 hours as needed.        .                 Medicines prescribed today were sent to:     [id number] - [place] - [address]    [address] [address]    Phone: [phone] Fax: [phone]    Open 24 Hours?: No      Medication refill instructions:       If your prescription bottle indicates you have medication refills left, it is not  necessary to call your provider’s office. Please contact your pharmacy and they will refill your medication.    If your prescription bottle indicates you do not have any refills left, you may request refills at any time through one of the following ways: The online [employer] system (except Urgent Care), by calling your provider’s office, or by asking your pharmacy to contact your provider’s office with a refill request. Medication refills are processed only during regular business hours and may not be available until the next business day. Your provider may request additional information or to have a follow-up visit with you prior to refilling your medication.   *Please Note: Medication refills are assigned a new Rx number when refilled electronically. Your pharmacy may indicate that no refills were authorized even though a new prescription for the same medication is available at the pharmacy. Please request the medicine by name with the pharmacy before contacting your provider for a refill.           [facility] Status: Patient Declined

## 2017-08-08 NOTE — MR AVS SNAPSHOT
"        Melita Herrera   2017 10:00 AM   Non-Provider Visit   MRN: [id number]    Department:  [facility]   Dept Phone:  [phone]    Description:  Female : 1970   Provider:  ONC RN 2           Allergies as of 2017     No Known Allergies      Vital Signs     Blood Pressure Pulse Temperature Respirations Height Weight    138/90 mmHg 88 37.8 °C (100 °F) 16 1.499 m (4' 11.02\") 53.071 kg (117 lb)    Body Mass Index Oxygen Saturation Last Menstrual Period Smoking Status          23.62 kg/m2 96% 2009 Never Smoker         Basic Information     Date Of Birth Sex Race Ethnicity Preferred Language Language for Written Material    1970 Female  or   Origin (Hungarian,Turks and [place] Islander,Malagasy,Jose, etc) Hungarian Hungarian      Your appointments     Aug 15, 2017 10:40 AM   ONCOLOGY EST PATIENT 30 MIN with Ramana Medrano M.D.   [facility] (--)    [address], [address]  [facility] [address]   [phone]            Aug 16, 2017  9:30 AM   Est Chemo 3 with RN 5   [facility] ([facility])    [address] [facility] [address]  [facility] [address]   [phone]            Aug 23, 2017  9:30 AM   Est Chemo 3 with RN 5   [facility] ([facility])    [address] [facility] [address] [facility] [address]  [facility] [address]   [phone]              Problem List              ICD-10-CM Priority Class Noted - Resolved    Metastasis from gastric cancer (CMS-HCC) C79.9, C16.9   2017 - Present      Health Maintenance        Date Due Completion Dates    IMM DTaP/Tdap/Td Vaccine (1 - Tdap) 1987 ---    PAP SMEAR 1989 ---    MAMMOGRAM 2008 ---    IMM INFLUENZA (1) 2017 ---            Current Immunizations     No immunizations on file.      Below and/or attached are the medications your provider expects you to take. Review all of your home medications and newly ordered medications with your provider and/or pharmacist. Follow medication instructions as directed by your provider " and/or pharmacist. Please keep your medication list with you and share with your provider. Update the information when medications are discontinued, doses are changed, or new medications (including over-the-counter products) are added; and carry medication information at all times in the event of emergency situations     Allergies:  No Known Allergies          Medications  Valid as of: August 08, 2017 - 11:42 AM    Generic Name Brand Name Tablet Size Instructions for use    Folic Acid   Take  by mouth.        LORazepam (Tab) ATIVAN 0.5 MG Take 1 Tab by mouth every 8 hours as needed for Anxiety for up to 5 doses.        Morphine Sulfate (Tab CR) MS CONTIN 30 MG Take 1 Tab by mouth every 12 hours.        Ondansetron HCl (Tab) ZOFRAN 4 MG Take 1 Tab by mouth every four hours as needed for Nausea/Vomiting.        Oxycodone-Acetaminophen (Tab) PERCOCET 5-325 MG Take 1-2 Tabs by mouth every four hours as needed.        Prochlorperazine Maleate (Tab) COMPAZINE 10 MG Take 1 Tab by mouth every 6 hours as needed.        .                 Medicines prescribed today were sent to:     [id number] - [place], [place] - [address]    [address] [address]    Phone: [phone] Fax: [phone]    Open 24 Hours?: No      Medication refill instructions:       If your prescription bottle indicates you have medication refills left, it is not necessary to call your provider’s office. Please contact your pharmacy and they will refill your medication.    If your prescription bottle indicates you do not have any refills left, you may request refills at any time through one of the following ways: The online [employer] system (except Urgent Care), by calling your provider’s office, or by asking your pharmacy to contact your provider’s office with a refill request. Medication refills are processed only during regular business hours and may not be available until the next business day. Your provider may request additional information  or to have a follow-up visit with you prior to refilling your medication.   *Please Note: Medication refills are assigned a new Rx number when refilled electronically. Your pharmacy may indicate that no refills were authorized even though a new prescription for the same medication is available at the pharmacy. Please request the medicine by name with the pharmacy before contacting your provider for a refill.           MyChart Status: Patient Declined

## 2017-08-08 NOTE — TELEPHONE ENCOUNTER
The patients daughter   would like to talk to you about her mom getting a referral  to  hospice.  Her phone number is [phone]

## 2017-08-08 NOTE — NON-PROVIDER
Melita Herrera is a 46 y.o. female here for a non-provider visit for: Lab Draws  on 8/8/2017 at 9:13 AM    Procedure Performed: Venipuncture     Anatomical site: Left Antecubital Area (AC)    Equipment used: 21 Gauge Needle     Labs drawn: , CBC w/diff, CMP and HCg  Qualitative UR     Ordering Provider: Dr. Ramana Cuenca By: Magda Barker, Med Ass't

## 2017-08-09 ENCOUNTER — TELEPHONE (OUTPATIENT)
Dept: HEMATOLOGY ONCOLOGY | Facility: MEDICAL CENTER | Age: 47
End: 2017-08-09

## 2017-08-09 ENCOUNTER — APPOINTMENT (OUTPATIENT)
Dept: ONCOLOGY | Facility: MEDICAL CENTER | Age: 47
End: 2017-08-09
Attending: INTERNAL MEDICINE
Payer: MEDICAID

## 2017-08-09 NOTE — TELEPHONE ENCOUNTER
"Patient called wanting her lab results. I informed the patient that I will have Monae the RN give her a call back with results. Ms Reyes was up set and stated she will be coming into the office instead because she didn't like the way we took her call and had her on hold. Ms. Reyes also stated that she wants a different type of medication since the medication she is taking is not helping. Patient didn't state the medication. When I asked what medication she was talking about she stated \"you should know\"    "

## 2017-08-09 NOTE — TELEPHONE ENCOUNTER
I contacted [employer] yesterday 8/08/2017 at approximately 1320 and 1640 and was informed the patient did not have lab results pending. I contacted again this morning and was told once again that there are no lab results from her most recent lab draw which is yesterday at 8/08/2017.     [facility] to see if they may have received the patient's labs but they do not have labs either.     I am informed by our lab at [facility] that Alfonzo from [employer] picked up the patient labs at approximately 1030.

## 2017-08-09 NOTE — TELEPHONE ENCOUNTER
[employer] informed Magda our Medical Assistant they only have results for the Cancer antigen 125 test and do not show other lab results pending.     [employer] contacted our office again and informed me that the patient's urine test is negative and the Cancer Antigen 125 results are to be faxed to our office. He informed me he does not have results for the CMP or CBC. I was transferred three times.     Latricia from [employer] informs me

## 2017-08-10 ENCOUNTER — OUTPATIENT INFUSION SERVICES (OUTPATIENT)
Dept: ONCOLOGY | Facility: MEDICAL CENTER | Age: 47
End: 2017-08-10
Attending: INTERNAL MEDICINE
Payer: MEDICAID

## 2017-08-10 ENCOUNTER — TELEPHONE (OUTPATIENT)
Dept: HEMATOLOGY ONCOLOGY | Facility: MEDICAL CENTER | Age: 47
End: 2017-08-10

## 2017-08-10 VITALS
WEIGHT: 116.18 LBS | DIASTOLIC BLOOD PRESSURE: 75 MMHG | OXYGEN SATURATION: 100 % | HEIGHT: 59 IN | BODY MASS INDEX: 23.42 KG/M2 | SYSTOLIC BLOOD PRESSURE: 110 MMHG | HEART RATE: 74 BPM | TEMPERATURE: 97.5 F | RESPIRATION RATE: 18 BRPM

## 2017-08-10 DIAGNOSIS — C16.9: ICD-10-CM

## 2017-08-10 DIAGNOSIS — C79.9 METASTASIS FROM GASTRIC CANCER (HCC): ICD-10-CM

## 2017-08-10 DIAGNOSIS — C16.9 METASTASIS FROM GASTRIC CANCER (HCC): ICD-10-CM

## 2017-08-10 LAB
ABO GROUP BLD: NORMAL
ABO GROUP BLD: NORMAL
BARCODED ABORH UBTYP: 5100
BARCODED ABORH UBTYP: 5100
BARCODED PRD CODE UBPRD: NORMAL
BARCODED PRD CODE UBPRD: NORMAL
BARCODED UNIT NUM UBUNT: NORMAL
BARCODED UNIT NUM UBUNT: NORMAL
BASOPHILS # BLD AUTO: 0.7 % (ref 0–1.8)
BASOPHILS # BLD: 0.03 K/UL (ref 0–0.12)
BLD GP AB SCN SERPL QL: NORMAL
COMPONENT R 8504R: NORMAL
COMPONENT R 8504R: NORMAL
EOSINOPHIL # BLD AUTO: 0.02 K/UL (ref 0–0.51)
EOSINOPHIL NFR BLD: 0.5 % (ref 0–6.9)
ERYTHROCYTE [DISTWIDTH] IN BLOOD BY AUTOMATED COUNT: 54.6 FL (ref 35.9–50)
HCT VFR BLD AUTO: 26.5 % (ref 37–47)
HGB BLD-MCNC: 8.1 G/DL (ref 12–16)
IMM GRANULOCYTES # BLD AUTO: 0.01 K/UL (ref 0–0.11)
IMM GRANULOCYTES NFR BLD AUTO: 0.2 % (ref 0–0.9)
LYMPHOCYTES # BLD AUTO: 0.73 K/UL (ref 1–4.8)
LYMPHOCYTES NFR BLD: 16.5 % (ref 22–41)
MCH RBC QN AUTO: 25.8 PG (ref 27–33)
MCHC RBC AUTO-ENTMCNC: 30.6 G/DL (ref 33.6–35)
MCV RBC AUTO: 84.4 FL (ref 81.4–97.8)
MONOCYTES # BLD AUTO: 0.58 K/UL (ref 0–0.85)
MONOCYTES NFR BLD AUTO: 13.1 % (ref 0–13.4)
NEUTROPHILS # BLD AUTO: 3.05 K/UL (ref 2–7.15)
NEUTROPHILS NFR BLD: 69 % (ref 44–72)
NRBC # BLD AUTO: 0 K/UL
NRBC BLD AUTO-RTO: 0 /100 WBC
PLATELET # BLD AUTO: 379 K/UL (ref 164–446)
PMV BLD AUTO: 9.7 FL (ref 9–12.9)
PRODUCT TYPE UPROD: NORMAL
PRODUCT TYPE UPROD: NORMAL
RBC # BLD AUTO: 3.14 M/UL (ref 4.2–5.4)
RH BLD: NORMAL
UNIT STATUS USTAT: NORMAL
UNIT STATUS USTAT: NORMAL
WBC # BLD AUTO: 4.4 K/UL (ref 4.8–10.8)

## 2017-08-10 PROCEDURE — 306780 HCHG STAT FOR TRANSFUSION PER CASE

## 2017-08-10 PROCEDURE — P9016 RBC LEUKOCYTES REDUCED: HCPCS

## 2017-08-10 PROCEDURE — A9270 NON-COVERED ITEM OR SERVICE: HCPCS | Performed by: INTERNAL MEDICINE

## 2017-08-10 PROCEDURE — 86900 BLOOD TYPING SEROLOGIC ABO: CPT

## 2017-08-10 PROCEDURE — 700101 HCHG RX REV CODE 250

## 2017-08-10 PROCEDURE — 86923 COMPATIBILITY TEST ELECTRIC: CPT

## 2017-08-10 PROCEDURE — 85025 COMPLETE CBC W/AUTO DIFF WBC: CPT

## 2017-08-10 PROCEDURE — 86850 RBC ANTIBODY SCREEN: CPT

## 2017-08-10 PROCEDURE — 86901 BLOOD TYPING SEROLOGIC RH(D): CPT

## 2017-08-10 PROCEDURE — 36430 TRANSFUSION BLD/BLD COMPNT: CPT

## 2017-08-10 PROCEDURE — A4212 NON CORING NEEDLE OR STYLET: HCPCS

## 2017-08-10 PROCEDURE — 700111 HCHG RX REV CODE 636 W/ 250 OVERRIDE (IP)

## 2017-08-10 PROCEDURE — 36591 DRAW BLOOD OFF VENOUS DEVICE: CPT

## 2017-08-10 PROCEDURE — 700102 HCHG RX REV CODE 250 W/ 637 OVERRIDE(OP): Performed by: INTERNAL MEDICINE

## 2017-08-10 RX ORDER — LIDOCAINE HYDROCHLORIDE 10 MG/ML
INJECTION, SOLUTION INFILTRATION; PERINEURAL
Status: COMPLETED
Start: 2017-08-10 | End: 2017-08-10

## 2017-08-10 RX ORDER — OXYCODONE HYDROCHLORIDE AND ACETAMINOPHEN 5; 325 MG/1; MG/1
1-2 TABLET ORAL EVERY 4 HOURS PRN
Qty: 10 TAB | Refills: 0 | Status: CANCELLED | OUTPATIENT
Start: 2017-08-10

## 2017-08-10 RX ORDER — OXYCODONE HYDROCHLORIDE AND ACETAMINOPHEN 5; 325 MG/1; MG/1
1 TABLET ORAL EVERY 8 HOURS PRN
Qty: 60 TAB | Refills: 0 | Status: SHIPPED | OUTPATIENT
Start: 2017-08-10

## 2017-08-10 RX ORDER — LORAZEPAM 0.5 MG/1
0.5 TABLET ORAL EVERY 8 HOURS PRN
Qty: 30 TAB | Refills: 0 | Status: SHIPPED | OUTPATIENT
Start: 2017-08-10

## 2017-08-10 RX ORDER — DIPHENHYDRAMINE HCL 25 MG
25 TABLET ORAL ONCE
Status: COMPLETED | OUTPATIENT
Start: 2017-08-10 | End: 2017-08-10

## 2017-08-10 RX ORDER — ACETAMINOPHEN 325 MG/1
650 TABLET ORAL ONCE
Status: COMPLETED | OUTPATIENT
Start: 2017-08-10 | End: 2017-08-10

## 2017-08-10 RX ADMIN — LIDOCAINE HYDROCHLORIDE: 10 INJECTION, SOLUTION INFILTRATION; PERINEURAL at 08:10

## 2017-08-10 RX ADMIN — ACETAMINOPHEN 650 MG: 325 TABLET, FILM COATED ORAL at 09:41

## 2017-08-10 RX ADMIN — DIPHENHYDRAMINE HCL 25 MG: 25 TABLET ORAL at 09:41

## 2017-08-10 RX ADMIN — HEPARIN 500 UNITS: 100 SYRINGE at 15:00

## 2017-08-10 ASSESSMENT — PAIN SCALES - GENERAL: PAINLEVEL: NO PAIN

## 2017-08-10 NOTE — TELEPHONE ENCOUNTER
Was the patient seen in the last year in this department? yes    Does patient have an active prescription for medications requested? No     Received Request Via: Patient

## 2017-08-10 NOTE — TELEPHONE ENCOUNTER
Prabhu from Hospice caled stating the Ms. Reyes has been in contact with her and she wanted to inform  that Ms. Reyes was thinking about doing Hospice.

## 2017-08-10 NOTE — TELEPHONE ENCOUNTER
The patients daughter called to see why the patients oxy was not sent to the pharmacy, explained to her for a controled substance that the  Writes out the prescription and that the prescription has to be picked up by a person over the age of eighteen and we have to see idea.

## 2017-08-10 NOTE — TELEPHONE ENCOUNTER
Patients daughter called back a second time, stating that we did not send her moms prescription to the pharmacy   Please look at the first encounter that I documented.

## 2017-08-11 NOTE — PROGRESS NOTES
Patient arrived ambulatory to the [facility] for blood transfusion with daughter. Translation services offered to pt, requests daughter translates. Reviewed labs, vitals signs, and MD order. Numbed right chest port with Lidocaine per pt request, accessed right chest port using sterile technique, obtained labs, flushed port. Contacted Dr Medrano to confirm with MD that 2 units of PRBC are ordered as only 1 unit is in treatment plan to be released, per MD, give 2 units per RBC's. Administered pre-treatment medications. Transfused 2 units PRBC's with no reaction noted. Joanna DURAN called to notify the pt that MD is willing to re-fill pain medication and pt needs to  script once transfusion is complete and to tell the pt that they are not to take this medication with Morphine. Utilized translation services Gerda to educate patient of MD's message. Patient verbalized understanding of these instructions. Port de-accessed by Bernice MILLER, gauze dressing and tape placed. Patient aware of upcoming apt dates and times. Patient left the [facility] ambulatory in no signs of distress.

## 2017-08-14 ENCOUNTER — OFFICE VISIT (OUTPATIENT)
Dept: HEMATOLOGY ONCOLOGY | Facility: MEDICAL CENTER | Age: 47
End: 2017-08-14
Payer: MEDICAID

## 2017-08-14 ENCOUNTER — NON-PROVIDER VISIT (OUTPATIENT)
Dept: HEMATOLOGY ONCOLOGY | Facility: MEDICAL CENTER | Age: 47
End: 2017-08-14
Payer: MEDICAID

## 2017-08-14 VITALS
RESPIRATION RATE: 16 BRPM | DIASTOLIC BLOOD PRESSURE: 82 MMHG | HEIGHT: 59 IN | SYSTOLIC BLOOD PRESSURE: 120 MMHG | WEIGHT: 113.76 LBS | HEART RATE: 83 BPM | TEMPERATURE: 99 F | OXYGEN SATURATION: 93 % | BODY MASS INDEX: 22.93 KG/M2

## 2017-08-14 VITALS
HEIGHT: 59 IN | BODY MASS INDEX: 22.78 KG/M2 | HEART RATE: 83 BPM | RESPIRATION RATE: 16 BRPM | WEIGHT: 113 LBS | OXYGEN SATURATION: 93 % | SYSTOLIC BLOOD PRESSURE: 120 MMHG | DIASTOLIC BLOOD PRESSURE: 82 MMHG | TEMPERATURE: 99 F

## 2017-08-14 DIAGNOSIS — C79.9 METASTASIS FROM GASTRIC CANCER (HCC): ICD-10-CM

## 2017-08-14 DIAGNOSIS — C16.9 METASTASIS FROM GASTRIC CANCER (HCC): ICD-10-CM

## 2017-08-14 PROCEDURE — 99214 OFFICE O/P EST MOD 30 MIN: CPT | Performed by: INTERNAL MEDICINE

## 2017-08-14 RX ORDER — FENTANYL 25 UG/1
1 PATCH TRANSDERMAL
Qty: 5 PATCH | Refills: 0 | Status: SHIPPED | OUTPATIENT
Start: 2017-08-14 | End: 2017-08-22

## 2017-08-14 ASSESSMENT — PAIN SCALES - GENERAL
PAINLEVEL: NO PAIN
PAINLEVEL: NO PAIN

## 2017-08-14 NOTE — Clinical Note
2017        Melita Herrera  : 1970        To Whom It May Concern:     Ms. Herrera is a patient of mine. She is diagnosed with metastatic gastric cancer.     Please contact my office with any questions or concerns.                          ________________________________________  Ramana Medrano Medical Oncologist, M.D.

## 2017-08-14 NOTE — PROGRESS NOTES
Melita Herrera is a 46 y.o. female here for a non-provider visit for: Lab Draws  on 8/14/2017 at 3:47 PM    Procedure Performed: Venipuncture     Anatomical site: Left Antecubital Area (AC)    Equipment used: 21g    Labs drawn: CBC w/diff, CMP and     Ordering Provider: Dr. Ramana Cuenca By: Bina Meyers, Jordan Ass't   Successful blood draw.

## 2017-08-14 NOTE — PROGRESS NOTES
Date of visit: 8/14/2017  3:12 PM      Chief Complaint- family meeting to discuss prognosis and treatment options      History of presenting illness: Melita Herrera  is a 46 y.o. year old female who is currently on paclitaxel and Ramucirumab for metastatic gastric carcinoma. I saw the patient last week. She appears to have some stabilization of her disease and the CT scan. There was some tumor marker improvement. However, she has been having worsening abdominal pain. She has some language barrier a lot of anxiety. During the last visit, there was conversation about continuing chemotherapy versus hospice care. Unfortunately, she does not have much family support other than 3 kids with the elder one aged 19.    I have prescribed MS Contin for her pain. However, she is reluctant to take this. She is taking periodic Percocet. She was inquiring about medical marijuana. She had significant anemia and received 2 units of packed red blood cells with some improvement in her fatigue.    Past Medical History:      Past Medical History   Diagnosis Date   • Metastasis from gastric cancer (CMS-HCC) 5/19/2017       Past surgical history:     No past surgical history on file.    Allergies:       Review of patient's allergies indicates no known allergies.    Medications:         Current Outpatient Prescriptions   Medication Sig Dispense Refill   • oxycodone-acetaminophen (PERCOCET) 5-325 MG Tab Take 1 Tab by mouth every 8 hours as needed. 60 Tab 0   • lorazepam (ATIVAN) 0.5 MG Tab Take 1 Tab by mouth every 8 hours as needed for Anxiety for up to 5 doses. 30 Tab 0   • FOLIC ACID PO Take  by mouth.     • ondansetron (ZOFRAN) 4 MG Tab tablet Take 1 Tab by mouth every four hours as needed for Nausea/Vomiting. 30 Tab 3   • prochlorperazine (COMPAZINE) 10 MG Tab Take 1 Tab by mouth every 6 hours as needed. 30 Tab 3     No current facility-administered medications for this visit.         Social History:     Social History  "    Social History   • Marital Status: Single     Spouse Name: N/A   • Number of Children: N/A   • Years of Education: N/A     Occupational History   • Not on file.     Social History Main Topics   • Smoking status: Never Smoker    • Smokeless tobacco: Never Used   • Alcohol Use: No   • Drug Use: No   • Sexual Activity: Not on file     Other Topics Concern   • Not on file     Social History Narrative       Family History:    No family history on file.    Review of Systems:  All other review of systems are negative except what was mentioned above in the HPI.    Constitutional: Negative for fever, chills, positive for weight loss and malaise/fatigue.    HEENT: No new auditory or visual complaints. No sore throat and neck pain.     Respiratory: Negative for cough, sputum production, shortness of breath and wheezing.    Cardiovascular: Negative for chest pain, palpitations, orthopnea and leg swelling.    Gastrointestinal: Negative for heartburn, nausea, vomiting. Positive for abdominal pain.    Genitourinary: Negative for dysuria, hematuria    Musculoskeletal: No new arthralgias or myalgias   Skin: Negative for rash and itching.    Neurological: Negative for focal weakness and headaches.    Endo/Heme/Allergies: No abnormal bleed/bruise.    Psychiatric/Behavioral: No new depression/anxiety.    Physical Exam:  Vitals: /82 mmHg  Pulse 83  Temp(Src) 37.2 °C (99 °F)  Resp 16  Ht 1.5 m (4' 11.06\")  Wt 51.6 kg (113 lb 12.1 oz)  BMI 22.93 kg/m2  SpO2 93%  LMP 03/08/2009  Breastfeeding? No    General: Not in acute distress, alert and oriented x 3  HEENT: No pallor, icterus. Oropharynx clear.   Neck: Supple, no palpable masses.  Lymph nodes: No palpable cervical, supraclavicular, axillary or inguinal lymphadenopathy.    CVS: regular rate and rhythm, no rubs or gallops  RESP: Clear to auscultate bilaterally, no wheezing or crackles.   ABD: Soft, generalized tenderness involving the epigastric area, non distended, " positive bowel sounds, no palpable organomegaly  EXT: No edema or cyanosis  CNS: Alert and oriented x3, No focal deficits.  Skin- No rash      Labs:   [facility] on 08/10/2017   Component Date Value Ref Range Status   • ABO Grouping Only 08/10/2017 O   Final   • Rh Grouping Only 08/10/2017 POS   Final   • Antibody Screen-Cod 08/10/2017 NEG   Final   • Component R 08/10/2017    Final                    Value:R3                  Red Blood Cells3    [id number]   transfused   08/10/17   10:22     • Product Type 08/10/2017 Red Blood Cells LR Pheresis   Final   • Dispense Status 08/10/2017 Transfused   Final   • Unit Number (Barcoded) 08/10/2017 [id number]   Final   • Product Code (Barcoded) 08/10/2017 [id number]   Final   • Blood Type (Barcoded) 08/10/2017 5100   Final   • Component R 08/10/2017    Final                    Value:R3                  Red Blood Cells3    [id number]   transfused   08/10/17   12:42     • Product Type 08/10/2017 Red Blood Cells LR Pheresis   Final   • Dispense Status 08/10/2017 Transfused   Final   • Unit Number (Barcoded) 08/10/2017 [id number]   Final   • Product Code (Barcoded) 08/10/2017 [id number]   Final   • Blood Type (Barcoded) 08/10/2017 5100   Final   • Second ABO Typing With Cod 08/10/2017 O   Final   • WBC 08/10/2017 4.4* 4.8 - 10.8 K/uL Final   • RBC 08/10/2017 3.14* 4.20 - 5.40 M/uL Final   • Hemoglobin 08/10/2017 8.1* 12.0 - 16.0 g/dL Final   • Hematocrit 08/10/2017 26.5* 37.0 - 47.0 % Final   • MCV 08/10/2017 84.4  81.4 - 97.8 fL Final   • MCH 08/10/2017 25.8* 27.0 - 33.0 pg Final   • MCHC 08/10/2017 30.6* 33.6 - 35.0 g/dL Final   • RDW 08/10/2017 54.6* 35.9 - 50.0 fL Final   • Platelet Count 08/10/2017 379  164 - 446 K/uL Final   • MPV 08/10/2017 9.7  9.0 - 12.9 fL Final   • Neutrophils-Polys 08/10/2017 69.00  44.00 - 72.00 % Final   • Lymphocytes 08/10/2017 16.50* 22.00 - 41.00 % Final   • Monocytes 08/10/2017 13.10  0.00 - 13.40 % Final   •  Eosinophils 08/10/2017 0.50  0.00 - 6.90 % Final   • Basophils 08/10/2017 0.70  0.00 - 1.80 % Final   • Immature Granulocytes 08/10/2017 0.20  0.00 - 0.90 % Final   • Nucleated RBC 08/10/2017 0.00   Final   • Neutrophils (Absolute) 08/10/2017 3.05  2.00 - 7.15 K/uL Final    Includes immature neutrophils, if present.   • Lymphs (Absolute) 08/10/2017 0.73* 1.00 - 4.80 K/uL Final   • Monos (Absolute) 08/10/2017 0.58  0.00 - 0.85 K/uL Final   • Eos (Absolute) 08/10/2017 0.02  0.00 - 0.51 K/uL Final   • Baso (Absolute) 08/10/2017 0.03  0.00 - 0.12 K/uL Final   • Immature Granulocytes (abs) 08/10/2017 0.01  0.00 - 0.11 K/uL Final   • NRBC (Absolute) 08/10/2017 0.00   Final             Assessment and Plan:  Metastatic gastric carcinoma-she was treated aggressively with Taxotere, 5-FU and oxaliplatin without major response. Unfortunately, there was some delay in her getting surgical evaluation early this year. She was deemed inoperable and had 2 more cycles of TFOX with further studies showing progression.     She tolerated the Taxol and a Ramucirumab reasonably well.Restaging CT scan shows stable /mild improvement in her disease.  level has improved. She has a difficult situation with not much family support, significant anxiety, worsening abdominal pain. Had a long discussion with the family today. Informed them about the CT report and tumor marker levels. Discussed the option of pursuing one more round of chemotherapy versus hospice. PD1 inhibitors still not approved for gastric carcinoma. She had She had microsatellite stable pattern with low tumor mutational  Carver .     After further discussion, the patient decided to go for more round of chemotherapy. We will recheck her labs today. She will return to clinic on Monday 8.    #2. Anemia-2, status post 2 units of packed blood cells. Etiology unclear. She related a physician in GI. Most probably this secondary to the chemotherapy.  3. Pain. She is reluctant to  consider opioids. Discussed starting fentanyl patch at 25 µg per hour if she can afford it. Prescriptions were given.. She also was requested for medical marijuana for symptom control and marijuana card was given to the patient.  4. Anxiety. Continue Ativan. She does not want to start any antidepressants at this time.   Had a brett discussion with the family about the incurable nature of her malignancy and preparedness for her eventual progression/hospice enrollment.      Total time spent for the family visit 40 minutes. All the above information was discussed with the patient and her family in detail.          She agreed and verbalized her agreement and understanding with the current plan.  I answered all questions and concerns she has at this time         Please note that this dictation was created using voice recognition software. I have made every reasonable attempt to correct obvious errors, but I expect that there are errors of grammar and possibly content that I did not discover before finalizing the note.      SIGNATURES:  Ramana Medrano    CC:  HUY Pan

## 2017-08-14 NOTE — MR AVS SNAPSHOT
"        Melita Herrera   2017 2:20 PM   Office Visit   MRN: [id number]    Department:  [facility]   Dept Phone:  [phone]    Description:  Female : 1970   Provider:  Ramana Medrano M.D.           Reason for Visit     New Patient 1wk      Allergies as of 2017     No Known Allergies      Vital Signs     Blood Pressure Pulse Temperature Respirations Height Weight    120/82 mmHg 83 37.2 °C (99 °F) 16 1.5 m (4' 11.06\") 51.6 kg (113 lb 12.1 oz)    Body Mass Index Oxygen Saturation Last Menstrual Period Breastfeeding? Smoking Status       22.93 kg/m2 93% 2009 No Never Smoker        Basic Information     Date Of Birth Sex Race Ethnicity Preferred Language Language for Written Material    1970 Female  or   Origin (Macedonian,Ghanaian,Equatorial Guinean,Jose, etc) Macedonian Macedonian      Your appointments     Aug 16, 2017  9:30 AM   Est Chemo 3 with RN 5   [facility]    [email or web address]  [place] [address]   [phone]            Aug 23, 2017  9:30 AM   Est Chemo 3 with RN 5   [facility] ([facility]    [address] [facility] [address]  [place] [address]   [phone]              Problem List              ICD-10-CM Priority Class Noted - Resolved    Metastasis from gastric cancer (CMS-HCC) C79.9, C16.9   2017 - Present      Health Maintenance        Date Due Completion Dates    IMM DTaP/Tdap/Td Vaccine (1 - Tdap) 1987 ---    PAP SMEAR 1989 ---    MAMMOGRAM 2008 ---    IMM INFLUENZA (1) 2017 ---            Current Immunizations     No immunizations on file.      Below and/or attached are the medications your provider expects you to take. Review all of your home medications and newly ordered medications with your provider and/or pharmacist. Follow medication instructions as directed by your provider and/or pharmacist. Please keep your medication list with you and share with your provider. Update the information " when medications are discontinued, doses are changed, or new medications (including over-the-counter products) are added; and carry medication information at all times in the event of emergency situations     Allergies:  No Known Allergies          Medications  Valid as of: August 14, 2017 -  3:11 PM    Generic Name Brand Name Tablet Size Instructions for use    Folic Acid   Take  by mouth.        LORazepam (Tab) ATIVAN 0.5 MG Take 1 Tab by mouth every 8 hours as needed for Anxiety for up to 5 doses.        Ondansetron HCl (Tab) ZOFRAN 4 MG Take 1 Tab by mouth every four hours as needed for Nausea/Vomiting.        Oxycodone-Acetaminophen (Tab) PERCOCET 5-325 MG Take 1 Tab by mouth every 8 hours as needed.        Prochlorperazine Maleate (Tab) COMPAZINE 10 MG Take 1 Tab by mouth every 6 hours as needed.        .                 Medicines prescribed today were sent to:     [facility] - [place], [place] - [address]    [address] [place] [address]    Phone: [phone] Fax: [phone]    Open 24 Hours?: No      Medication refill instructions:       If your prescription bottle indicates you have medication refills left, it is not necessary to call your provider’s office. Please contact your pharmacy and they will refill your medication.    If your prescription bottle indicates you do not have any refills left, you may request refills at any time through one of the following ways: The online [employer] system (except Urgent Care), by calling your provider’s office, or by asking your pharmacy to contact your provider’s office with a refill request. Medication refills are processed only during regular business hours and may not be available until the next business day. Your provider may request additional information or to have a follow-up visit with you prior to refilling your medication.   *Please Note: Medication refills are assigned a new Rx number when refilled electronically. Your pharmacy may indicate that no  refills were authorized even though a new prescription for the same medication is available at the pharmacy. Please request the medicine by name with the pharmacy before contacting your provider for a refill.           MyChart Status: Patient Declined

## 2017-08-14 NOTE — MR AVS SNAPSHOT
Melita Herrera   2017 3:00 PM   Appointment   MRN: [id number]    Department:  [facility]   Dept Phone:  [phone]    Description:  Female : 1970   Provider:  ONC MA 1           Allergies as of 2017     No Known Allergies      Vital Signs     Last Menstrual Period Smoking Status                2009 Never Smoker           Basic Information     Date Of Birth Sex Race Ethnicity Preferred Language Language for Written Material    1970 Female  or   Origin (Ukrainian,Citizen of [place]ian,[place], etc) Ukrainian Ukrainian      Your appointments     Aug 16, 2017  9:30 AM   Est Chemo 3 with RN 5   [facility]    [address]  [place] [address] [phone]            Aug 22, 2017 10:15 AM   Non Provider 1 with ONC MA 1   [facility] (--)    [address], [facility] [address]  [place] [address] [phone]           You will be receiving a confirmation call a few days before your appointment from our automated call confirmation system.            Aug 22, 2017 11:40 AM   ONCOLOGY EST PATIENT 30 MIN with Ramana Medrano M.D.   [facility] (--)    [address], [address]  [place] [address] [phone]            Aug 23, 2017  9:30 AM   Est Chemo 3 with RN 5   [facility])    [address] [facility] [address]  [place] [address] [phone]              Problem List              ICD-10-CM Priority Class Noted - Resolved    Metastasis from gastric cancer (CMS-HCC) C79.9, C16.9   2017 - Present      Health Maintenance        Date Due Completion Dates    IMM DTaP/Tdap/Td Vaccine (1 - Tdap) 1987 ---    PAP SMEAR 1989 ---    MAMMOGRAM 2008 ---    IMM INFLUENZA (1) 2017 ---            Current Immunizations     No immunizations on file.      Below and/or attached are the medications your provider expects you to take. Review all of your home medications and newly ordered  medications with your provider and/or pharmacist. Follow medication instructions as directed by your provider and/or pharmacist. Please keep your medication list with you and share with your provider. Update the information when medications are discontinued, doses are changed, or new medications (including over-the-counter products) are added; and carry medication information at all times in the event of emergency situations     Allergies:  No Known Allergies          Medications  Valid as of: August 14, 2017 -  3:32 PM    Generic Name Brand Name Tablet Size Instructions for use    FentaNYL (PATCH 72 HR) DURAGESIC 25 MCG/HR Apply 1 Patch to skin as directed every 72 hours.        Folic Acid   Take  by mouth.        LORazepam (Tab) ATIVAN 0.5 MG Take 1 Tab by mouth every 8 hours as needed for Anxiety for up to 5 doses.        Ondansetron HCl (Tab) ZOFRAN 4 MG Take 1 Tab by mouth every four hours as needed for Nausea/Vomiting.        Oxycodone-Acetaminophen (Tab) PERCOCET 5-325 MG Take 1 Tab by mouth every 8 hours as needed.        Prochlorperazine Maleate (Tab) COMPAZINE 10 MG Take 1 Tab by mouth every 6 hours as needed.        .                 Medicines prescribed today were sent to:     [id number] [place] [place] [facility] [address]    [address] [address]    Phone: [phone] Fax: [phone]    Open 24 Hours?: No      Medication refill instructions:       If your prescription bottle indicates you have medication refills left, it is not necessary to call your provider’s office. Please contact your pharmacy and they will refill your medication.    If your prescription bottle indicates you do not have any refills left, you may request refills at any time through one of the following ways: The online [employer] system (except Urgent Care), by calling your provider’s office, or by asking your pharmacy to contact your provider’s office with a refill request. Medication refills are processed only during regular  business hours and may not be available until the next business day. Your provider may request additional information or to have a follow-up visit with you prior to refilling your medication.   *Please Note: Medication refills are assigned a new Rx number when refilled electronically. Your pharmacy may indicate that no refills were authorized even though a new prescription for the same medication is available at the pharmacy. Please request the medicine by name with the pharmacy before contacting your provider for a refill.           MyChart Status: Patient Declined

## 2017-08-16 ENCOUNTER — OUTPATIENT INFUSION SERVICES (OUTPATIENT)
Dept: ONCOLOGY | Facility: MEDICAL CENTER | Age: 47
End: 2017-08-16
Attending: INTERNAL MEDICINE
Payer: MEDICAID

## 2017-08-16 VITALS
RESPIRATION RATE: 18 BRPM | WEIGHT: 111.77 LBS | OXYGEN SATURATION: 100 % | TEMPERATURE: 99.5 F | BODY MASS INDEX: 22.53 KG/M2 | SYSTOLIC BLOOD PRESSURE: 112 MMHG | HEIGHT: 59 IN | DIASTOLIC BLOOD PRESSURE: 87 MMHG | HEART RATE: 80 BPM

## 2017-08-16 DIAGNOSIS — C79.9 METASTASIS FROM GASTRIC CANCER (HCC): ICD-10-CM

## 2017-08-16 DIAGNOSIS — C16.9 METASTASIS FROM GASTRIC CANCER (HCC): ICD-10-CM

## 2017-08-16 DIAGNOSIS — Z51.11 ENCOUNTER FOR ANTINEOPLASTIC CHEMOTHERAPY: ICD-10-CM

## 2017-08-16 LAB
APPEARANCE UR: ABNORMAL
BACTERIA #/AREA URNS HPF: ABNORMAL /HPF
BILIRUB UR QL STRIP.AUTO: NEGATIVE
COLOR UR: ABNORMAL
EPI CELLS #/AREA URNS HPF: ABNORMAL /HPF
GLUCOSE UR STRIP.AUTO-MCNC: NEGATIVE MG/DL
HCG UR QL: NEGATIVE
KETONES UR STRIP.AUTO-MCNC: 15 MG/DL
LEUKOCYTE ESTERASE UR QL STRIP.AUTO: ABNORMAL
MICRO URNS: ABNORMAL
MUCOUS THREADS #/AREA URNS HPF: ABNORMAL /HPF
NITRITE UR QL STRIP.AUTO: NEGATIVE
PH UR STRIP.AUTO: 5.5 [PH]
PROT UR QL STRIP: NEGATIVE MG/DL
RBC # URNS HPF: ABNORMAL /HPF
RBC UR QL AUTO: NEGATIVE
SP GR UR REFRACTOMETRY: 1.03
SP GR UR STRIP.AUTO: 1.03
UROBILINOGEN UR STRIP.AUTO-MCNC: 1 MG/DL
WBC #/AREA URNS HPF: ABNORMAL /HPF

## 2017-08-16 PROCEDURE — 700111 HCHG RX REV CODE 636 W/ 250 OVERRIDE (IP): Performed by: INTERNAL MEDICINE

## 2017-08-16 PROCEDURE — 96417 CHEMO IV INFUS EACH ADDL SEQ: CPT

## 2017-08-16 PROCEDURE — 96413 CHEMO IV INFUSION 1 HR: CPT

## 2017-08-16 PROCEDURE — 700102 HCHG RX REV CODE 250 W/ 637 OVERRIDE(OP): Performed by: INTERNAL MEDICINE

## 2017-08-16 PROCEDURE — A9270 NON-COVERED ITEM OR SERVICE: HCPCS | Performed by: INTERNAL MEDICINE

## 2017-08-16 PROCEDURE — 81025 URINE PREGNANCY TEST: CPT

## 2017-08-16 PROCEDURE — 304540 HCHG NITRO SET VENT 2ND TUB

## 2017-08-16 PROCEDURE — 700105 HCHG RX REV CODE 258: Performed by: INTERNAL MEDICINE

## 2017-08-16 PROCEDURE — 700111 HCHG RX REV CODE 636 W/ 250 OVERRIDE (IP)

## 2017-08-16 PROCEDURE — 96375 TX/PRO/DX INJ NEW DRUG ADDON: CPT

## 2017-08-16 PROCEDURE — A4212 NON CORING NEEDLE OR STYLET: HCPCS

## 2017-08-16 PROCEDURE — 81001 URINALYSIS AUTO W/SCOPE: CPT

## 2017-08-16 PROCEDURE — 700101 HCHG RX REV CODE 250

## 2017-08-16 RX ORDER — ACETAMINOPHEN 325 MG/1
650 TABLET ORAL ONCE
Status: COMPLETED | OUTPATIENT
Start: 2017-08-16 | End: 2017-08-16

## 2017-08-16 RX ORDER — DIPHENHYDRAMINE HCL 25 MG
50 TABLET ORAL ONCE
Status: COMPLETED | OUTPATIENT
Start: 2017-08-16 | End: 2017-08-16

## 2017-08-16 RX ORDER — LIDOCAINE HYDROCHLORIDE 10 MG/ML
INJECTION, SOLUTION INFILTRATION; PERINEURAL
Status: COMPLETED
Start: 2017-08-16 | End: 2017-08-16

## 2017-08-16 RX ADMIN — FAMOTIDINE 20 MG: 10 INJECTION INTRAVENOUS at 12:12

## 2017-08-16 RX ADMIN — ACETAMINOPHEN 650 MG: 325 TABLET, FILM COATED ORAL at 12:10

## 2017-08-16 RX ADMIN — HEPARIN 500 UNITS: 100 SYRINGE at 16:06

## 2017-08-16 RX ADMIN — SODIUM CHLORIDE 400 MG: 9 INJECTION, SOLUTION INTRAVENOUS at 13:35

## 2017-08-16 RX ADMIN — DEXAMETHASONE SODIUM PHOSPHATE 12 MG: 4 INJECTION, SOLUTION INTRAMUSCULAR; INTRAVENOUS at 12:15

## 2017-08-16 RX ADMIN — LIDOCAINE HYDROCHLORIDE: 10 INJECTION, SOLUTION INFILTRATION; PERINEURAL at 10:02

## 2017-08-16 RX ADMIN — ONDANSETRON HYDROCHLORIDE 16 MG: 2 SOLUTION INTRAMUSCULAR; INTRAVENOUS at 12:36

## 2017-08-16 RX ADMIN — PACLITAXEL 116 MG: 6 INJECTION, SOLUTION, CONCENTRATE INTRAVENOUS at 14:55

## 2017-08-16 RX ADMIN — DIPHENHYDRAMINE HCL 50 MG: 25 TABLET ORAL at 12:10

## 2017-08-16 ASSESSMENT — PAIN SCALES - GENERAL: PAINLEVEL: NO PAIN

## 2017-08-16 NOTE — PROGRESS NOTES
Chemotherapy Verification - SECONDARY RN       Height = 150cm  Weight = 50.7kg  BSA = 1.45m2    Medication: ramucirumab  Dose: 8mg/kg  Calculated Dose: 405.6mg                            (In mg/m2, AUC, mg/kg)     Medication: paclitaxel  Dose: 80mg/m2  Calculated Dose: 116mg                            (In mg/m2, AUC, mg/kg)           I confirm that this process was performed independently.

## 2017-08-16 NOTE — PROGRESS NOTES
"Pharmacy Chemotherapy Verification    Patient Name: Melita Herrera  Dx: Metastatic gastric carcinoma    Protocol: Ramucirumab + Paclitaxel    Ramucirumab 8 mg/kg IV on days 1 and 15  Paclitaxel 80 mg/m2 IV on days 1, 8, and 15  28 day cycle  Lyssa H, Raymundo K, Marcus Barraza E, et al; [place] [facility]. Ramucirumab plus paclitaxel versus placebo plus paclitaxel in patients with previously treated advanced gastric or gastro-oesophageal junction adenocarcinoma ([place]): a double-blind, randomised phase 3 trial. Lancet Oncol. 2014;15(11):7784-3055.    Allergies: Review of patient's allergies indicates no known allergies.  /87 mmHg  Pulse 80  Temp(Src) 37.5 °C (99.5 °F)  Resp 18  Ht 1.5 m (4' 11.06\")  Wt 50.7 kg (111 lb 12.4 oz)  BMI 22.53 kg/m2  SpO2 100%  LMP 03/08/2009  Body surface area is 1.45 meters squared.    Labs 8/14/17 ([facility]):  ANC~ 5380 Plt = 429k   Hgb = 12.3   SCr = 0.59 mg/dL CrCl ~ 79 mL/min LFT's = WNL TBili = 0.3     Urine Beta Hcg: negative      Urine protein = negative     Drug Order   (Drug name, dose, route, IV Fluid & volume, frequency, number of doses) Cycle: 3 Day 1  Previous treatment: C2D15 = 7/26/17     Medication = Ramucirumab  Base Dose= 8 mg/kg  Calc Dose: Base Dose x 50.7 kg = 405.6 mg  Final Dose = 400 mg  Route = IV  Fluid & Volume =  mL  Admin Duration = Over 60 minutes   Administer first          <5% difference, OK to treat with final dose      Medication = PACLItaxel  Base Dose= 80 mg/m2  Calc Dose: Base Dose x 1.45 m2 = 116 mg  Final Dose = 116 mg  Route = IVmg  Fluid & Volume =  mL  Admin Duration = Over 60 minutes             <5% difference, OK to treat with final dose        By my signature below, I confirm this process was performed independently with the BSA and all final chemotherapy dosing calculations congruent. I have reviewed the above chemotherapy order and that my calculation of the final dose and BSA (when applicable) corroborate " those calculations of the  pharmacist. Discrepancies of 5% or greater in the written dose have been addressed and documented within the EPIC Progress notes.    Magda Huerta, PharmD

## 2017-08-16 NOTE — PROGRESS NOTES
Chemotherapy Verification - PRIMARY RN      Height = 150 cm  Weight = 50.7 kg  BSA = 1.45 m2       Medication: Paclitaxel  Dose: 80 mg/m2  Calculated Dose: 116 mg                             (In mg/m2, AUC, mg/kg)     Medication: Ramucirumab  Dose: 8 mg/kg  Calculated Dose: 405.6 mg                             (In mg/m2, AUC, mg/kg)      I confirm this process was performed independently with the BSA and all final chemotherapy dosing calculations congruent.  Any discrepancies of 5% or greater have been addressed with the chemotherapy pharmacist. The resolution of the discrepancy has been documented in the EPIC progress notes.

## 2017-08-16 NOTE — PROGRESS NOTES
"Pharmacy Chemotherapy Verification    Patient Name: Melita Herrera     Dx: metastatic gastric cancer    Cycle: 3 Day 1   Previous treatment:C2D15 on 7/26/17     Protocol: Cyramza + Taxol  Ramucirumab (Cyramza) 8 mg/kg IV on Days 1 and 15  Paclitaxel (Taxol) 80 mg/m2 IV on Days 1, 8, and 15  28-day cycle    Lyssa JOYA, et al. Ramucirumab plus paclitaxel versus placebo plus paclitaxel in patients with previously treated advanced gastric or gastro-oesophageal junction adenocarcinoma ([place]): a double-blind, randomised phase 3 trial. Lancet Oncol. 2014 Oct;15(11):1224-35.      Allergies: Review of patient's allergies indicates no known allergies.  /87 mmHg  Pulse 80  Temp(Src) 37.5 °C (99.5 °F)  Resp 18  Ht 1.5 m (4' 11.06\")  Wt 50.7 kg (111 lb 12.4 oz)  BMI 22.53 kg/m2  SpO2 100%  LMP 03/08/2009   Body surface area is 1.45 meters squared.    Labs 8/14/17   ANC~5380 Plt = 429k   Hgb = 12.3     SCr = 0.59 mg/dL CrCl ~80 mL/min (using min Scr 0.7)  AST/ALT/AP = WNL TBili = 0.3     Urine protein = negative    Labs 8/16/17     BhCG = negative    Ramucirumab (Cyramza) 8 mg/kg x 50.7 kg = 405.6 mg              <5% difference, rounded to nearest vial size per protocol, OK to treat with final written dose = 400 mg IV     Paclitaxel (Taxol) 80 mg/m² x 1.45 m² = 116 mg   <5% difference, OK to treat with final written dose = 116 mg IV    Pietro Marsh, ZakD                       "

## 2017-08-17 NOTE — PROGRESS NOTES
Patient arrived ambulatory to the \[facility]\"" for  Day 1 Taxol/Cyramza. Patient aware of translation services available, utilized Translation services [employer] to assist with communication.  Reviewed vital signs, labs, and physician orders. Urine sent to lab to confirm negative pregnancy test per MD order. Numbed right chest port with Lidocaine per patient request, accessed port using sterile technique. Pharmacist Pietro requested MD order for UA and TSH, Dr Medrano called, ordered UA, no TSH required. UA sent to lab, results called to MD with + bacteria, leukocytes, WBC's and RBC's, MD gave ok to treat patient, no further orders received. Patient denies S&S of UTI, reviewed importance maintaining proper hydration of 6-8 8 ounce glasses of water/day, and contact MD with any issues or concerns with urination, patient verbalized understanding. Pre- treatment medications administered per MD orders. Cyramza administered, no reaction noted, line flushed. Taxol infused at rate, no adverse reaction noted. Flushed port per protocol, carpenter needle removed with tip intact, placed gauze dressing with tape. Patient provided handout of upcoming apt's. Patient left the \[facility]\"" ambulatory in no signs of distress.

## 2017-08-21 DIAGNOSIS — C79.9 METASTASIS FROM GASTRIC CANCER (HCC): ICD-10-CM

## 2017-08-21 DIAGNOSIS — C16.9 METASTASIS FROM GASTRIC CANCER (HCC): ICD-10-CM

## 2017-08-22 ENCOUNTER — NON-PROVIDER VISIT (OUTPATIENT)
Dept: HEMATOLOGY ONCOLOGY | Facility: MEDICAL CENTER | Age: 47
End: 2017-08-22
Payer: MEDICAID

## 2017-08-22 ENCOUNTER — OFFICE VISIT (OUTPATIENT)
Dept: HEMATOLOGY ONCOLOGY | Facility: MEDICAL CENTER | Age: 47
End: 2017-08-22
Payer: MEDICAID

## 2017-08-22 VITALS
DIASTOLIC BLOOD PRESSURE: 80 MMHG | TEMPERATURE: 97.7 F | HEART RATE: 79 BPM | BODY MASS INDEX: 22.82 KG/M2 | HEIGHT: 59 IN | OXYGEN SATURATION: 97 % | SYSTOLIC BLOOD PRESSURE: 110 MMHG | WEIGHT: 113.21 LBS

## 2017-08-22 VITALS
OXYGEN SATURATION: 97 % | TEMPERATURE: 97.7 F | SYSTOLIC BLOOD PRESSURE: 110 MMHG | BODY MASS INDEX: 22.78 KG/M2 | HEART RATE: 79 BPM | DIASTOLIC BLOOD PRESSURE: 80 MMHG | HEIGHT: 59 IN | RESPIRATION RATE: 14 BRPM | WEIGHT: 113 LBS

## 2017-08-22 DIAGNOSIS — C16.9 METASTASIS FROM GASTRIC CANCER (HCC): ICD-10-CM

## 2017-08-22 DIAGNOSIS — C79.9 METASTASIS FROM GASTRIC CANCER (HCC): ICD-10-CM

## 2017-08-22 PROCEDURE — 36415 COLL VENOUS BLD VENIPUNCTURE: CPT | Performed by: INTERNAL MEDICINE

## 2017-08-22 PROCEDURE — 99214 OFFICE O/P EST MOD 30 MIN: CPT | Performed by: INTERNAL MEDICINE

## 2017-08-22 ASSESSMENT — PAIN SCALES - GENERAL
PAINLEVEL: 3=SLIGHT PAIN
PAINLEVEL: 5=MODERATE PAIN

## 2017-08-22 NOTE — PROGRESS NOTES
Date of visit: 8/22/2017  12:06 PM      Chief Complaint- metastatic gastric carcinoma. HER-2/elena negative,MSI-stable, low tumor mutational burden      History of presenting illness: Melita Herrera  is a 46 y.o. year old female who is here for follow-up of metastatic gastric carcinoma. She presented with epigastric pain and was found to have an adenocarcinomaof the greater curvature of the stomach measuring 4 x 3.8 cm on CT scan 8/15/16. The biopsy showed  adenocarcinoma with signet ring cells.     She was seen by Dr. Jordan who started neoadjuvant chemotherapy with 3 cycles of TFOX with the last one given at [place] on 10/27/16. Subsequent CT scan of the abdomen  on 10/20/60 which showed the 8 x 3 cm density on the ventral margin of the greater curvature of the stomach was relatively stable.     She was to be referred to her surgeon at [facility] who felt that Dr. Frederick would be a better option for her surgery and the patient was to have been called in surgery to be arranged. Unfortunately, this had not happened and she went to the emergency room at [place] on 12/23/16 with epigastric pain and evaluation was negative.    Unfortunately, there was some delay prior to her seeing Dr. Tyler. On, 1/25/17, the laparoscopic evaluation showed peritoneal spread and she was deemed inoperable. She received 2 more doses of TFOX. 4/19/17CT abdomen showed significant interval enlargement of irregular mass abutting the gastric wall and extending into the omentum with significant interval worsening of diffuse omental metastasis. Tiny 3 mm hypodense liver lesion possibly a new metastases along with a new 4 mm right lower lobe pulmonary nodule.  Foundation one testing showed p53 mutation and a few other mutation with no actionable mutations found.She had low tumor mutational burden.     5/2017-established care here and finally started Taxol and  Ramucirumab through assistance with  reasonably good tolerance. Pretreatment CT scan showed significant disease progression with a new omental mass,  2.2 cm liver mass, new thoracolumbar spine metastatic disease. I nodule involving the right lower lobe of the lung and left breast .    She did develop some cytopenias. Her tumor marker  improved to 12 from pretreatment value of 92.    She is status post 3 cycles. Restaging CT scan done on 8/7/2017 shows slightly less prominent gastric wall thickening and omental mass. Pelvic deferred, 2 cm liver mass is stable. Her spine metastatic disease is also stable.    Had multiple discussions with the patient including a family meeting. Informed him that she has incurable disease. With the last CT scan showing some disease control. She elected to continue treatment. She also had significant anemia and received 2 units of packed red blood cells.    She is here for cycle #4, day 8 of paclitaxel.. She has abdominal pain/tenderness, which is reasonably controlled. Denies significant peripheral neuropathy. Denies any bleeding.      Past Medical History:      Past Medical History   Diagnosis Date   • Metastasis from gastric cancer (CMS-HCC) 5/19/2017       Past surgical history:     No past surgical history on file.    Allergies:       Review of patient's allergies indicates no known allergies.    Medications:         Current Outpatient Prescriptions   Medication Sig Dispense Refill   • fentanyl (DURAGESIC) 25 MCG/HR PATCH 72 HR Apply 1 Patch to skin as directed every 72 hours. 5 Patch 0   • oxycodone-acetaminophen (PERCOCET) 5-325 MG Tab Take 1 Tab by mouth every 8 hours as needed. 60 Tab 0   • lorazepam (ATIVAN) 0.5 MG Tab Take 1 Tab by mouth every 8 hours as needed for Anxiety for up to 5 doses. 30 Tab 0   • FOLIC ACID PO Take  by mouth.     • ondansetron (ZOFRAN) 4 MG Tab tablet Take 1 Tab by mouth every four hours as needed for Nausea/Vomiting. 30 Tab 3   • prochlorperazine (COMPAZINE) 10 MG Tab Take 1 Tab by  "mouth every 6 hours as needed. 30 Tab 3     No current facility-administered medications for this visit.         Social History:     Social History     Social History   • Marital Status: Single     Spouse Name: N/A   • Number of Children: N/A   • Years of Education: N/A     Occupational History   • Not on file.     Social History Main Topics   • Smoking status: Never Smoker    • Smokeless tobacco: Never Used   • Alcohol Use: No   • Drug Use: No   • Sexual Activity: Not on file     Other Topics Concern   • Not on file     Social History Narrative       Family History:    No family history on file.    Review of Systems:  All other review of systems are negative except what was mentioned above in the HPI. We will obtain through the .    Constitutional: Negative for fever, chills, weight loss. Positive for malaise/fatigue.    HEENT: No new auditory or visual complaints. No sore throat and neck pain.     Respiratory: Negative for cough, sputum production, shortness of breath and wheezing.    Cardiovascular: Negative for chest pain, palpitations, orthopnea and leg swelling.    Gastrointestinal: Negative for heartburn, nausea, vomiting. Positive for abdominal pain.    Genitourinary: Negative for dysuria, hematuria    Musculoskeletal: No new arthralgias or myalgias   Skin: Negative for rash and itching.    Neurological: Negative for focal weakness and headaches.    Endo/Heme/Allergies: No abnormal bleed/bruise.    Psychiatric/Behavioral: No new depression/anxiety.    Physical Exam:  Vitals: /80 mmHg  Pulse 79  Temp(Src) 36.5 °C (97.7 °F)  Resp 14  Ht 1.499 m (4' 11\")  Wt 51.256 kg (113 lb)  BMI 22.81 kg/m2  SpO2 97%  LMP 03/08/2009    General: Not in acute distress, alert and oriented x 3  HEENT: No pallor, icterus. Oropharynx clear.   Neck: Supple, no palpable masses.  Lymph nodes: No palpable cervical, supraclavicular, axillary or inguinal lymphadenopathy.    CVS: regular rate and rhythm, no rubs " or gallops  RESP: Clear to auscultate bilaterally, no wheezing or crackles.   ABD: Soft,non distended, positive bowel sounds, no palpable organomegaly. Nonspecific tenderness in the epigastric region  EXT: No edema or cyanosis  CNS: Alert and oriented x3, No focal deficits.  Skin- No rash      Labs:   [facility] on 08[facility]16/2017   Component Date Value Ref Range Status   • Beta-Hcg Urine 08/16/2017 Negative  Negative Final   • Specific Gravity 08/16/2017 1.028   Final   • Color 08/16/2017 DK Yellow   Final   • Character 08/16/2017 Cloudy*  Final   • Specific Gravity 08/16/2017 1.029  <1.035 Final   • Ph 08/16/2017 5.5  5.0-8.0 Final   • Glucose 08/16/2017 Negative  Negative mg/dL Final   • Ketones 08/16/2017 15* Negative mg/dL Final   • Protein 08/16/2017 Negative  Negative mg/dL Final   • Bilirubin 08/16/2017 Negative  Negative Final   • Urobilinogen, Urine 08/16/2017 1.0  Negative Final   • Nitrite 08/16/2017 Negative  Negative Final   • Leukocyte Esterase 08/16/2017 Moderate* Negative Final   • Occult Blood 08/16/2017 Negative  Negative Final   • Micro Urine Req 08/16/2017 Microscopic   Final   • WBC 08/16/2017 5-10*  Final    Comment: Female  <12 Yr 0-2  >12 Yr 0-5  Male   None     • RBC 08/16/2017 2-5*  Final    Comment: Female  >12 Yr 0-2  Male   None     • Bacteria 08/16/2017 Moderate* None /hpf Final   • Epithelial Cells 08/16/2017 Many*  Final   • Mucous Threads 08/16/2017 Many   Final             Assessment and Plan:  1. Metastatic gastric carcinoma-she was treated aggressively with Taxotere, 5-FU and oxaliplatin without major response. Unfortunately, there was some delay in her getting surgical evaluation early this year. She was deemed inoperable and had 2 more cycles of TFOX with further studies showing progression.     She tolerated the Taxol and a Ramucirumab reasonably well.Restaging CT scan shows stable /mild improvement in her disease.  level has improved. She has a difficult  situation with not much family support, significant anxiety, worsening abdominal pain. We had a family meeting and it was decided to pursue further treatment. Symptomatically, she appears to be better after receiving blood transfusion. Labs are currently pending. She will receive day 8 . Paclitaxel tomorrow followed by day 15 paclitaxel and Ramucirumab.  She has exhausted chemotherapy option. If she develops documented progression. Hopefully, Pembrolizumab will be FDA approved by then.      2. Symptomatic anemia secondary to chemotherapy-her labs from last week shows resolution of her anemia. She is feeling better.  3. 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 She cannot afford the fentanyl patch. She will continue when necessary Percocet.  4. Anxiety. Continue Ativan. She does not want to start any antidepressants at this time.      She agreed and verbalized her agreement and understanding with the current plan.  I answered all questions and concerns she has at this time         Please note that this dictation was created using voice recognition software. I have made every reasonable attempt to correct obvious errors, but I expect that there are errors of grammar and possibly content that I did not discover before finalizing the note.      SIGNATURES:  Ramana Medrano    CC:  HUY Pan  No ref. provider found

## 2017-08-22 NOTE — MR AVS SNAPSHOT
"        Melita WinkleramilRobina   2017 10:15 AM   Non-Provider Visit   MRN: [id number]    Department:  [facility]   Dept Phone:  [phone]    Description:  Female : 1970   Provider:  ONC MA 1           Reason for Visit     Establish Care cbc, cmp labs      Allergies as of 2017     No Known Allergies      Vital Signs     Blood Pressure Pulse Temperature Height Weight Body Mass Index    110/80 mmHg 79 36.5 °C (97.7 °F) 1.499 m (4' 11.02\") 51.35 kg (113 lb 3.3 oz) 22.85 kg/m2    Oxygen Saturation Last Menstrual Period Breastfeeding? Smoking Status          97% 2009 No Never Smoker         Basic Information     Date Of Birth Sex Race Ethnicity Preferred Language Language for Written Material    1970 Female  or   Origin (Emirati,Cymro,Stateless,Burundian, etc) Emirati Emirati      Your appointments     Aug 22, 2017 11:40 AM   ONCOLOGY EST PATIENT 30 MIN with Ramana Medrano M.D.   [facility] (--)    [address] [place] [address], [address]  [facility] [address]   [phone]            Aug 23, 2017  9:30 AM   Est Chemo 3 with RN 5   [facility] ([facility])    [address]  [facility] [address]   [phone]            Aug 30, 2017  2:00 PM   Est Chemo 3 with RN 6   [facility] ([facility])    [address]  [facility] [address]   [phone]              Problem List              ICD-10-CM Priority Class Noted - Resolved    Metastasis from gastric cancer (CMS-HCC) C79.9, C16.9   2017 - Present      Health Maintenance        Date Due Completion Dates    IMM DTaP/Tdap/Td Vaccine (1 - Tdap) 1987 ---    PAP SMEAR 1989 ---    MAMMOGRAM 2008 ---    IMM INFLUENZA (1) 2017 ---            Current Immunizations     No immunizations on file.      Below and/or attached are the medications your provider expects you to take. Review all of your home medications and newly ordered medications with your provider and/or " pharmacist. Follow medication instructions as directed by your provider and/or pharmacist. Please keep your medication list with you and share with your provider. Update the information when medications are discontinued, doses are changed, or new medications (including over-the-counter products) are added; and carry medication information at all times in the event of emergency situations     Allergies:  No Known Allergies          Medications  Valid as of: August 22, 2017 - 11:15 AM    Generic Name Brand Name Tablet Size Instructions for use    FentaNYL (PATCH 72 HR) DURAGESIC 25 MCG/HR Apply 1 Patch to skin as directed every 72 hours.        Folic Acid   Take  by mouth.        LORazepam (Tab) ATIVAN 0.5 MG Take 1 Tab by mouth every 8 hours as needed for Anxiety for up to 5 doses.        Ondansetron HCl (Tab) ZOFRAN 4 MG Take 1 Tab by mouth every four hours as needed for Nausea/Vomiting.        Oxycodone-Acetaminophen (Tab) PERCOCET 5-325 MG Take 1 Tab by mouth every 8 hours as needed.        Prochlorperazine Maleate (Tab) COMPAZINE 10 MG Take 1 Tab by mouth every 6 hours as needed.        .                 Medicines prescribed today were sent to:     [facility] - [place], [place] - [address]    [address] [place] [address]    Phone: [phone] Fax: [phone]    Open 24 Hours?: No      Medication refill instructions:       If your prescription bottle indicates you have medication refills left, it is not necessary to call your provider’s office. Please contact your pharmacy and they will refill your medication.    If your prescription bottle indicates you do not have any refills left, you may request refills at any time through one of the following ways: The online [employer]ulS[employer] system (except Urgent Care), by calling your provider’s office, or by asking your pharmacy to contact your provider’s office with a refill request. Medication refills are processed only during regular business hours and may not be  available until the next business day. Your provider may request additional information or to have a follow-up visit with you prior to refilling your medication.   *Please Note: Medication refills are assigned a new Rx number when refilled electronically. Your pharmacy may indicate that no refills were authorized even though a new prescription for the same medication is available at the pharmacy. Please request the medicine by name with the pharmacy before contacting your provider for a refill.           MyChart Status: Patient Declined

## 2017-08-22 NOTE — MR AVS SNAPSHOT
"        Melita GalvanThang   2017 11:40 AM   Office Visit   MRN: [id number]    Department:  [facility]   Dept Phone:  [phone]    Description:  Female : 1970   Provider:  Ramana Medrano M.D.           Reason for Visit     Follow-Up           Allergies as of 2017     No Known Allergies      Vital Signs     Blood Pressure Pulse Temperature Respirations Height Weight    110/80 mmHg 79 36.5 °C (97.7 °F) 14 1.499 m (4' 11\") 51.256 kg (113 lb)    Body Mass Index Oxygen Saturation Last Menstrual Period Smoking Status          22.81 kg/m2 97% 2009 Never Smoker         Basic Information     Date Of Birth Sex Race Ethnicity Preferred Language Language for Written Material    1970 Female  or   Origin (Kittitian,Macanese,Burundian,Jose, etc) Kittitian Kittitian      Your appointments     Aug 23, 2017  9:30 AM   Est Chemo 3 with RN 5   [facility] ([facility])    [address] [facility] [address]  [place] [address]   [phone]            Aug 30, 2017  2:00 PM   Est Chemo 3 with RN 6   [facility] ([facility])    [address] [facility]  [place] [address]   [phone]            Sep 12, 2017 11:30 AM   Non Provider 1 with ONC MA 1   [facility] (--)    [address], [facility] [address]  [facility] [id number] [phone]           You will be receiving a confirmation call a few days before your appointment from our automated call confirmation system.            Sep 12, 2017  1:00 PM   ONCOLOGY EST PATIENT 30 MIN with Ramana Medrano M.D.   [facility] (--)    [address], [address]  [facility] [address] [phone]            Sep 13, 2017 10:30 AM   Est Chemo 3 with RN 6   [facility] ([facility])    [address] [facility]  [place] [address]   [phone]            Sep 20, 2017  9:00 AM   Est Chemo 3 with RN 1   [facility] ([facility])    [address]  [place] [address]   [phone]            Sep 27, 2017 10:30 " AM   Est Chemo 3 with RN 6   [facility] ([facility])    [address] [facility] [address] [facility] [address]  [place] [id number] [phone]              Problem List              ICD-10-CM Priority Class Noted - Resolved    Metastasis from gastric cancer (CMS-HCC) C79.9, C16.9   5/19/2017 - Present      Health Maintenance        Date Due Completion Dates    IMM DTaP/Tdap/Td Vaccine (1 - Tdap) 12/11/1987 ---    PAP SMEAR 12/11/1989 ---    MAMMOGRAM 12/11/2008 ---    IMM INFLUENZA (1) 9/1/2017 ---            Current Immunizations     No immunizations on file.      Below and/or attached are the medications your provider expects you to take. Review all of your home medications and newly ordered medications with your provider and/or pharmacist. Follow medication instructions as directed by your provider and/or pharmacist. Please keep your medication list with you and share with your provider. Update the information when medications are discontinued, doses are changed, or new medications (including over-the-counter products) are added; and carry medication information at all times in the event of emergency situations     Allergies:  No Known Allergies          Medications  Valid as of: August 22, 2017 - 12:24 PM    Generic Name Brand Name Tablet Size Instructions for use    FentaNYL (PATCH 72 HR) DURAGESIC 25 MCG/HR Apply 1 Patch to skin as directed every 72 hours.        Folic Acid   Take  by mouth.        LORazepam (Tab) ATIVAN 0.5 MG Take 1 Tab by mouth every 8 hours as needed for Anxiety for up to 5 doses.        Ondansetron HCl (Tab) ZOFRAN 4 MG Take 1 Tab by mouth every four hours as needed for Nausea/Vomiting.        Oxycodone-Acetaminophen (Tab) PERCOCET 5-325 MG Take 1 Tab by mouth every 8 hours as needed.        Prochlorperazine Maleate (Tab) COMPAZINE 10 MG Take 1 Tab by mouth every 6 hours as needed.        .                 Medicines prescribed today were sent to:     [id number] - [place] - [address] [facility]    [address]  [place] [address]    Phone: [phone] Fax: [phone]    Open 24 Hours?: No      Medication refill instructions:       If your prescription bottle indicates you have medication refills left, it is not necessary to call your provider’s office. Please contact your pharmacy and they will refill your medication.    If your prescription bottle indicates you do not have any refills left, you may request refills at any time through one of the following ways: The online [employer] system (except Urgent Care), by calling your provider’s office, or by asking your pharmacy to contact your provider’s office with a refill request. Medication refills are processed only during regular business hours and may not be available until the next business day. Your provider may request additional information or to have a follow-up visit with you prior to refilling your medication.   *Please Note: Medication refills are assigned a new Rx number when refilled electronically. Your pharmacy may indicate that no refills were authorized even though a new prescription for the same medication is available at the pharmacy. Please request the medicine by name with the pharmacy before contacting your provider for a refill.        Referral     A referral request has been sent to our patient care coordination department. Please allow 3-5 business days for us to process this request and contact you either by phone or mail. If you do not hear from us by the 5th business day, please call us at [phone].           NUOFFER Status: Patient Declined

## 2017-08-23 ENCOUNTER — OUTPATIENT INFUSION SERVICES (OUTPATIENT)
Dept: ONCOLOGY | Facility: MEDICAL CENTER | Age: 47
End: 2017-08-23
Attending: INTERNAL MEDICINE
Payer: MEDICAID

## 2017-08-23 VITALS
TEMPERATURE: 98.2 F | HEART RATE: 85 BPM | HEIGHT: 59 IN | WEIGHT: 114.42 LBS | DIASTOLIC BLOOD PRESSURE: 67 MMHG | SYSTOLIC BLOOD PRESSURE: 102 MMHG | OXYGEN SATURATION: 96 % | RESPIRATION RATE: 18 BRPM | BODY MASS INDEX: 23.07 KG/M2

## 2017-08-23 DIAGNOSIS — C79.9 METASTASIS FROM GASTRIC CANCER (HCC): ICD-10-CM

## 2017-08-23 DIAGNOSIS — C16.9 METASTASIS FROM GASTRIC CANCER (HCC): ICD-10-CM

## 2017-08-23 LAB
ALBUMIN SERPL BCP-MCNC: 3.1 G/DL (ref 3.2–4.9)
ALBUMIN/GLOB SERPL: 1.1 G/DL
ALP SERPL-CCNC: 66 U/L (ref 30–99)
ALT SERPL-CCNC: 14 U/L (ref 2–50)
ANION GAP SERPL CALC-SCNC: 7 MMOL/L (ref 0–11.9)
AST SERPL-CCNC: 25 U/L (ref 12–45)
BASOPHILS # BLD AUTO: 0.5 % (ref 0–1.8)
BASOPHILS # BLD: 0.02 K/UL (ref 0–0.12)
BILIRUB SERPL-MCNC: 0.3 MG/DL (ref 0.1–1.5)
BUN SERPL-MCNC: 13 MG/DL (ref 8–22)
CALCIUM SERPL-MCNC: 8.7 MG/DL (ref 8.5–10.5)
CHLORIDE SERPL-SCNC: 105 MMOL/L (ref 96–112)
CO2 SERPL-SCNC: 26 MMOL/L (ref 20–33)
CREAT SERPL-MCNC: 0.51 MG/DL (ref 0.5–1.4)
EOSINOPHIL # BLD AUTO: 0.08 K/UL (ref 0–0.51)
EOSINOPHIL NFR BLD: 1.9 % (ref 0–6.9)
ERYTHROCYTE [DISTWIDTH] IN BLOOD BY AUTOMATED COUNT: 55.8 FL (ref 35.9–50)
GFR SERPL CREATININE-BSD FRML MDRD: >60 ML/MIN/1.73 M 2
GLOBULIN SER CALC-MCNC: 2.7 G/DL (ref 1.9–3.5)
GLUCOSE SERPL-MCNC: 112 MG/DL (ref 65–99)
HCT VFR BLD AUTO: 35.2 % (ref 37–47)
HGB BLD-MCNC: 11 G/DL (ref 12–16)
IMM GRANULOCYTES # BLD AUTO: 0.02 K/UL (ref 0–0.11)
IMM GRANULOCYTES NFR BLD AUTO: 0.5 % (ref 0–0.9)
LYMPHOCYTES # BLD AUTO: 1.01 K/UL (ref 1–4.8)
LYMPHOCYTES NFR BLD: 24.3 % (ref 22–41)
MCH RBC QN AUTO: 26.5 PG (ref 27–33)
MCHC RBC AUTO-ENTMCNC: 30.9 G/DL (ref 33.6–35)
MCV RBC AUTO: 85.9 FL (ref 81.4–97.8)
MONOCYTES # BLD AUTO: 0.26 K/UL (ref 0–0.85)
MONOCYTES NFR BLD AUTO: 6.3 % (ref 0–13.4)
NEUTROPHILS # BLD AUTO: 2.76 K/UL (ref 2–7.15)
NEUTROPHILS NFR BLD: 66.5 % (ref 44–72)
NRBC # BLD AUTO: 0 K/UL
NRBC BLD AUTO-RTO: 0 /100 WBC
PLATELET # BLD AUTO: 349 K/UL (ref 164–446)
PMV BLD AUTO: 10.2 FL (ref 9–12.9)
POTASSIUM SERPL-SCNC: 3.7 MMOL/L (ref 3.6–5.5)
PROT SERPL-MCNC: 5.8 G/DL (ref 6–8.2)
RBC # BLD AUTO: 4.11 M/UL (ref 4.2–5.4)
SODIUM SERPL-SCNC: 138 MMOL/L (ref 135–145)
WBC # BLD AUTO: 4.2 K/UL (ref 4.8–10.8)

## 2017-08-23 PROCEDURE — 96413 CHEMO IV INFUSION 1 HR: CPT

## 2017-08-23 PROCEDURE — 85025 COMPLETE CBC W/AUTO DIFF WBC: CPT

## 2017-08-23 PROCEDURE — 304540 HCHG NITRO SET VENT 2ND TUB

## 2017-08-23 PROCEDURE — 700102 HCHG RX REV CODE 250 W/ 637 OVERRIDE(OP): Performed by: INTERNAL MEDICINE

## 2017-08-23 PROCEDURE — A4212 NON CORING NEEDLE OR STYLET: HCPCS

## 2017-08-23 PROCEDURE — 80053 COMPREHEN METABOLIC PANEL: CPT

## 2017-08-23 PROCEDURE — 96375 TX/PRO/DX INJ NEW DRUG ADDON: CPT

## 2017-08-23 PROCEDURE — 700111 HCHG RX REV CODE 636 W/ 250 OVERRIDE (IP)

## 2017-08-23 PROCEDURE — A9270 NON-COVERED ITEM OR SERVICE: HCPCS | Performed by: INTERNAL MEDICINE

## 2017-08-23 PROCEDURE — 700105 HCHG RX REV CODE 258: Performed by: INTERNAL MEDICINE

## 2017-08-23 PROCEDURE — 700111 HCHG RX REV CODE 636 W/ 250 OVERRIDE (IP): Performed by: INTERNAL MEDICINE

## 2017-08-23 PROCEDURE — 700101 HCHG RX REV CODE 250

## 2017-08-23 RX ORDER — LIDOCAINE HYDROCHLORIDE 10 MG/ML
INJECTION, SOLUTION INFILTRATION; PERINEURAL
Status: COMPLETED
Start: 2017-08-23 | End: 2017-08-23

## 2017-08-23 RX ORDER — DIPHENHYDRAMINE HCL 25 MG
50 TABLET ORAL ONCE
Status: COMPLETED | OUTPATIENT
Start: 2017-08-23 | End: 2017-08-23

## 2017-08-23 RX ORDER — ACETAMINOPHEN 325 MG/1
650 TABLET ORAL ONCE
Status: COMPLETED | OUTPATIENT
Start: 2017-08-23 | End: 2017-08-23

## 2017-08-23 RX ADMIN — ONDANSETRON HYDROCHLORIDE 16 MG: 2 SOLUTION INTRAMUSCULAR; INTRAVENOUS at 11:37

## 2017-08-23 RX ADMIN — ACETAMINOPHEN 650 MG: 325 TABLET, FILM COATED ORAL at 11:34

## 2017-08-23 RX ADMIN — LIDOCAINE HYDROCHLORIDE: 10 INJECTION, SOLUTION INFILTRATION; PERINEURAL at 10:20

## 2017-08-23 RX ADMIN — PACLITAXEL 116 MG: 6 INJECTION, SOLUTION, CONCENTRATE INTRAVENOUS at 12:25

## 2017-08-23 RX ADMIN — FAMOTIDINE 20 MG: 10 INJECTION INTRAVENOUS at 11:38

## 2017-08-23 RX ADMIN — DIPHENHYDRAMINE HCL 50 MG: 25 TABLET ORAL at 11:37

## 2017-08-23 RX ADMIN — HEPARIN 500 UNITS: 100 SYRINGE at 14:06

## 2017-08-23 RX ADMIN — DEXAMETHASONE SODIUM PHOSPHATE 12 MG: 4 INJECTION, SOLUTION INTRAMUSCULAR; INTRAVENOUS at 11:57

## 2017-08-23 ASSESSMENT — PAIN SCALES - GENERAL: PAINLEVEL: NO PAIN

## 2017-08-23 NOTE — PROGRESS NOTES
Patient presents ambulatory for chemotherapy.  Patient reports feeling well and denies any s/s of infection at this time.  Due to insurance issue, patient needs to have labs drawn in [facility] for chemo, patient educated regarding need for labs in [facility], patient verbalized understanding.  Phone call to MD office to let them know as well.  Port accessed using sterile technique, blood return noted, and labs collected per MD order.  Labs reviewed and are within parameters to proceed with treatment.  Pre medications given with no adverse effects.  Taxol infused over one hour per MD hour with no complications or adverse reactions.  Patient to return 8/30/17, confirmed with patient.  Port flushed per protocol, blood return noted, de accessed, gauze, and tape applied to site.  Patient left ambulatory in no distress.

## 2017-08-23 NOTE — PROGRESS NOTES
"Pharmacy Chemotherapy Verification Note:    Patient Name: Melita Herrera      Dx: Metastatic Gastric Carcinoma      Protocol: Ramucirumab + PaACLItaxel         *Dosing Reference*  Ramucirumab 8 mg/kg IV on days 1 and 15  PACLItaxel 80 mg/m² IV on days 1, 8, and 15  28 day cycle    Lyssa JOYA, Raymundo K, Marcus Barraza E, et al; [place] Study [facility]. Ramucirumab plus paclitaxel versus placebo plus paclitaxel in patients with previously treated advanced gastric or gastro-oesophageal junction adenocarcinoma ([place]): a double-blind, randomised phase 3 trial. Lancet Oncol. 2014;15(11):8382-9617.     Allergies:  Review of patient's allergies indicates no known allergies.     /67 mmHg  Pulse 85  Temp(Src) 36.8 °C (98.2 °F)  Resp 18  Ht 1.5 m (4' 11.06\")  Wt 51.9 kg (114 lb 6.7 oz)  BMI 23.07 kg/m2  SpO2 96%  LMP 03/08/2009 Body surface area is 1.47 meters squared.  ANC~ 2760  Plt = 349 k  SCr = 0.51 mg/dL  CrCl = 81 mL/min (using min SCr 0.7 mg/dL and current weight)  LFT = WNL  TBili = 0.3     Drug Order   (Drug name, dose, route, IV Fluid & volume, frequency, number of doses) Cycle: 3, Day 8      Previous treatment: C3D1 = 8/16/17     Medication = PACLItaxel (Taxol)  Base Dose = 80 mg/m²  Calc Dose: Base Dose x 1.47 m² = 118 mg  Final Dose = 116 mg  Route = IV  Fluid & Volume =  mL  Admin Duration = Over 60 minutes          <5% difference, ok to treat with final written dose     By my signature below, I confirm this process was performed independently with the BSA and all final chemotherapy dosing calculations congruent. I have reviewed the above chemotherapy order and that my calculation of the final dose and BSA (when applicable) corroborate those calculations of the  pharmacist.     Martín Méndez, PharmD, BCPS      "

## 2017-08-23 NOTE — PROGRESS NOTES
Chemotherapy Verification - SECONDARY RN       Height = 150cm  Weight = 51.9kg  BSA = 1.47m2    Medication: Paclitaxel  Dose: 80mg/m2  Calculated Dose: 117.6mg                            (In mg/m2, AUC, mg/kg)       I confirm that this process was performed independently.

## 2017-08-23 NOTE — PROGRESS NOTES
Chemotherapy Verification - PRIMARY RN      Height = 59.06 in  Weight = 51.9 kg  BSA = 1.47 m2       Medication: Paclitaxel  Dose: 80 mg/m2  Calculated Dose: 117.6 mg                             (In mg/m2, AUC, mg/kg)     I confirm this process was performed independently with the BSA and all final chemotherapy dosing calculations congruent.  Any discrepancies of 5% or greater have been addressed with the chemotherapy pharmacist. The resolution of the discrepancy has been documented in the EPIC progress notes.

## 2017-08-23 NOTE — PROGRESS NOTES
"Pharmacy Chemotherapy Verification    Patient Name: Melita Herrera     Dx: Metastatic gastric cancer    Cycle: 3 Day 8   Previous treatment:C3D1 on 8/16/17     Protocol: Cyramza + PACLItaxel  Ramucirumab (Cyramza) 8 mg/kg IV on Days 1 and 15  PACLItaxel (Taxol) 80 mg/m2 IV on Days 1, 8, and 15  28-day cycle    Lyssa JOYA, et al. Ramucirumab plus paclitaxel versus placebo plus paclitaxel in patients with previously treated advanced gastric or gastro-oesophageal junction adenocarcinoma ([place]): a double-blind, randomised phase 3 trial. Lancet Oncol. 2014 Oct;15(11):1224-35.      Allergies: Review of patient's allergies indicates no known allergies.  /67 mmHg  Pulse 85  Temp(Src) 36.8 °C (98.2 °F)  Resp 18  Ht 1.5 m (4' 11.06\")  Wt 51.9 kg (114 lb 6.7 oz)  BMI 23.07 kg/m2  SpO2 96%  LMP 03/08/2009   Body surface area is 1.47 meters squared.    Labs 8/23/17    ANC~2760 Plt = 349k   Hgb = 11     SCr = 0.51 mg/dL CrCl ~80 mL/min (using min SCr 0.7)  AST/ALT/AP = 25/14/66 TBili = 0.3       Labs 8/16/17     BhCG = negative  Urine protein = negative    PACLItaxel (Taxol) 80 mg/m² x 1.47 m² = 117.6 mg   <5% difference, OK to treat with final written dose = 116 mg IV    Mansi Robles, PharmD                      "

## 2017-08-25 NOTE — NON-PROVIDER
Melita Herrera is a 46 y.o. female here for a non-provider visit for: Lab Draws  on 8/24/2017 at 10:50 AM    Procedure Performed: Venipuncture     Anatomical site: Left Antecubital Area (AC)    Equipment used: 21 Gauge Needle     Labs drawn:  CBC CMP    Ordering Provider: Dr. Ramana Cuenca By: [facility]

## 2017-08-30 ENCOUNTER — PATIENT OUTREACH (OUTPATIENT)
Dept: HEALTH INFORMATION MANAGEMENT | Facility: OTHER | Age: 47
End: 2017-08-30

## 2017-08-30 ENCOUNTER — PATIENT OUTREACH (OUTPATIENT)
Dept: OTHER | Facility: MEDICAL CENTER | Age: 47
End: 2017-08-30

## 2017-08-30 ENCOUNTER — OUTPATIENT INFUSION SERVICES (OUTPATIENT)
Dept: ONCOLOGY | Facility: MEDICAL CENTER | Age: 47
End: 2017-08-30
Attending: INTERNAL MEDICINE
Payer: MEDICAID

## 2017-08-30 VITALS
TEMPERATURE: 98.8 F | OXYGEN SATURATION: 95 % | BODY MASS INDEX: 22.44 KG/M2 | SYSTOLIC BLOOD PRESSURE: 126 MMHG | HEIGHT: 59 IN | HEART RATE: 78 BPM | WEIGHT: 111.33 LBS | DIASTOLIC BLOOD PRESSURE: 81 MMHG | RESPIRATION RATE: 18 BRPM

## 2017-08-30 DIAGNOSIS — C16.9 METASTASIS FROM GASTRIC CANCER (HCC): ICD-10-CM

## 2017-08-30 DIAGNOSIS — C79.9 METASTASIS FROM GASTRIC CANCER (HCC): ICD-10-CM

## 2017-08-30 LAB
ALBUMIN SERPL BCP-MCNC: 3.6 G/DL (ref 3.2–4.9)
ALBUMIN/GLOB SERPL: 1.3 G/DL
ALP SERPL-CCNC: 72 U/L (ref 30–99)
ALT SERPL-CCNC: 14 U/L (ref 2–50)
ANION GAP SERPL CALC-SCNC: 7 MMOL/L (ref 0–11.9)
APPEARANCE UR: CLEAR
AST SERPL-CCNC: 34 U/L (ref 12–45)
BACTERIA #/AREA URNS HPF: ABNORMAL /HPF
BASOPHILS # BLD AUTO: 0.3 % (ref 0–1.8)
BASOPHILS # BLD: 0.01 K/UL (ref 0–0.12)
BILIRUB SERPL-MCNC: 0.4 MG/DL (ref 0.1–1.5)
BILIRUB UR QL STRIP.AUTO: NEGATIVE
BUN SERPL-MCNC: 14 MG/DL (ref 8–22)
CALCIUM SERPL-MCNC: 9 MG/DL (ref 8.5–10.5)
CHLORIDE SERPL-SCNC: 107 MMOL/L (ref 96–112)
CO2 SERPL-SCNC: 24 MMOL/L (ref 20–33)
COLOR UR: YELLOW
CREAT SERPL-MCNC: 0.48 MG/DL (ref 0.5–1.4)
EOSINOPHIL # BLD AUTO: 0.05 K/UL (ref 0–0.51)
EOSINOPHIL NFR BLD: 1.5 % (ref 0–6.9)
EPI CELLS #/AREA URNS HPF: ABNORMAL /HPF
ERYTHROCYTE [DISTWIDTH] IN BLOOD BY AUTOMATED COUNT: 57.7 FL (ref 35.9–50)
GFR SERPL CREATININE-BSD FRML MDRD: >60 ML/MIN/1.73 M 2
GLOBULIN SER CALC-MCNC: 2.7 G/DL (ref 1.9–3.5)
GLUCOSE SERPL-MCNC: 106 MG/DL (ref 65–99)
GLUCOSE UR STRIP.AUTO-MCNC: NEGATIVE MG/DL
HCT VFR BLD AUTO: 35.4 % (ref 37–47)
HGB BLD-MCNC: 11.1 G/DL (ref 12–16)
HYALINE CASTS #/AREA URNS LPF: ABNORMAL /LPF
IMM GRANULOCYTES # BLD AUTO: 0.01 K/UL (ref 0–0.11)
IMM GRANULOCYTES NFR BLD AUTO: 0.3 % (ref 0–0.9)
KETONES UR STRIP.AUTO-MCNC: NEGATIVE MG/DL
LEUKOCYTE ESTERASE UR QL STRIP.AUTO: ABNORMAL
LYMPHOCYTES # BLD AUTO: 0.8 K/UL (ref 1–4.8)
LYMPHOCYTES NFR BLD: 24.4 % (ref 22–41)
MCH RBC QN AUTO: 26.6 PG (ref 27–33)
MCHC RBC AUTO-ENTMCNC: 31.4 G/DL (ref 33.6–35)
MCV RBC AUTO: 84.9 FL (ref 81.4–97.8)
MICRO URNS: ABNORMAL
MONOCYTES # BLD AUTO: 0.28 K/UL (ref 0–0.85)
MONOCYTES NFR BLD AUTO: 8.5 % (ref 0–13.4)
MUCOUS THREADS #/AREA URNS HPF: ABNORMAL /HPF
NEUTROPHILS # BLD AUTO: 2.13 K/UL (ref 2–7.15)
NEUTROPHILS NFR BLD: 65 % (ref 44–72)
NITRITE UR QL STRIP.AUTO: NEGATIVE
NRBC # BLD AUTO: 0 K/UL
NRBC BLD AUTO-RTO: 0 /100 WBC
PH UR STRIP.AUTO: 5 [PH]
PLATELET # BLD AUTO: 237 K/UL (ref 164–446)
PMV BLD AUTO: 10.1 FL (ref 9–12.9)
POTASSIUM SERPL-SCNC: 3.7 MMOL/L (ref 3.6–5.5)
PROT SERPL-MCNC: 6.3 G/DL (ref 6–8.2)
PROT UR QL STRIP: NEGATIVE MG/DL
RBC # BLD AUTO: 4.17 M/UL (ref 4.2–5.4)
RBC # URNS HPF: ABNORMAL /HPF
RBC UR QL AUTO: NEGATIVE
SODIUM SERPL-SCNC: 138 MMOL/L (ref 135–145)
SP GR UR STRIP.AUTO: 1.03
UROBILINOGEN UR STRIP.AUTO-MCNC: 1 MG/DL
WBC # BLD AUTO: 3.3 K/UL (ref 4.8–10.8)
WBC #/AREA URNS HPF: ABNORMAL /HPF

## 2017-08-30 PROCEDURE — 700105 HCHG RX REV CODE 258

## 2017-08-30 PROCEDURE — 700111 HCHG RX REV CODE 636 W/ 250 OVERRIDE (IP): Performed by: INTERNAL MEDICINE

## 2017-08-30 PROCEDURE — 700111 HCHG RX REV CODE 636 W/ 250 OVERRIDE (IP)

## 2017-08-30 PROCEDURE — 80053 COMPREHEN METABOLIC PANEL: CPT

## 2017-08-30 PROCEDURE — 96375 TX/PRO/DX INJ NEW DRUG ADDON: CPT

## 2017-08-30 PROCEDURE — 81001 URINALYSIS AUTO W/SCOPE: CPT

## 2017-08-30 PROCEDURE — 96413 CHEMO IV INFUSION 1 HR: CPT

## 2017-08-30 PROCEDURE — 96417 CHEMO IV INFUS EACH ADDL SEQ: CPT

## 2017-08-30 PROCEDURE — 700102 HCHG RX REV CODE 250 W/ 637 OVERRIDE(OP): Performed by: INTERNAL MEDICINE

## 2017-08-30 PROCEDURE — 85025 COMPLETE CBC W/AUTO DIFF WBC: CPT

## 2017-08-30 PROCEDURE — 304540 HCHG NITRO SET VENT 2ND TUB

## 2017-08-30 PROCEDURE — 700105 HCHG RX REV CODE 258: Performed by: INTERNAL MEDICINE

## 2017-08-30 PROCEDURE — A9270 NON-COVERED ITEM OR SERVICE: HCPCS | Performed by: INTERNAL MEDICINE

## 2017-08-30 PROCEDURE — A4212 NON CORING NEEDLE OR STYLET: HCPCS

## 2017-08-30 PROCEDURE — 700101 HCHG RX REV CODE 250

## 2017-08-30 RX ORDER — ACETAMINOPHEN 325 MG/1
650 TABLET ORAL ONCE
Status: COMPLETED | OUTPATIENT
Start: 2017-08-30 | End: 2017-08-30

## 2017-08-30 RX ORDER — DIPHENHYDRAMINE HCL 25 MG
50 TABLET ORAL ONCE
Status: COMPLETED | OUTPATIENT
Start: 2017-08-30 | End: 2017-08-30

## 2017-08-30 RX ORDER — LIDOCAINE HYDROCHLORIDE 10 MG/ML
INJECTION, SOLUTION INFILTRATION; PERINEURAL
Status: COMPLETED
Start: 2017-08-30 | End: 2017-08-30

## 2017-08-30 RX ADMIN — HEPARIN 500 UNITS: 100 SYRINGE at 14:42

## 2017-08-30 RX ADMIN — SODIUM CHLORIDE 400 MG: 9 INJECTION, SOLUTION INTRAVENOUS at 12:15

## 2017-08-30 RX ADMIN — PACLITAXEL 116 MG: 6 INJECTION, SOLUTION, CONCENTRATE INTRAVENOUS at 13:29

## 2017-08-30 RX ADMIN — ONDANSETRON HYDROCHLORIDE 16 MG: 2 SOLUTION INTRAMUSCULAR; INTRAVENOUS at 11:30

## 2017-08-30 RX ADMIN — DIPHENHYDRAMINE HCL 50 MG: 25 TABLET ORAL at 11:24

## 2017-08-30 RX ADMIN — LIDOCAINE HYDROCHLORIDE: 10 INJECTION, SOLUTION INFILTRATION; PERINEURAL at 10:21

## 2017-08-30 RX ADMIN — FAMOTIDINE 20 MG: 10 INJECTION INTRAVENOUS at 11:25

## 2017-08-30 RX ADMIN — ACETAMINOPHEN 650 MG: 325 TABLET, FILM COATED ORAL at 11:25

## 2017-08-30 RX ADMIN — DEXAMETHASONE SODIUM PHOSPHATE 12 MG: 4 INJECTION, SOLUTION INTRAMUSCULAR; INTRAVENOUS at 11:49

## 2017-08-30 ASSESSMENT — PAIN SCALES - GENERAL: PAINLEVEL: 3=SLIGHT PAIN

## 2017-08-30 NOTE — PROGRESS NOTES
Pt arrived to [facility], ambulatory, for Cyramza/Taxol infusions. Pt voices no complaints. Port accessed in sterile manner, positive blood return noted. Labs and urine drawn per order. Labs reviewed, pt within parameters to treat today. Pre-medications given with no s/sx of adverse reaction. Cyramza and taxol infused with no s/sx of adverse reaction. Port flushed and heparin locked per policy, port de-accessed. Pt left [facility] with no s/sx of distress. Follow up appointment confirmed.

## 2017-08-30 NOTE — PROGRESS NOTES
"Pharmacy Chemotherapy Verification    Patient Name: Melita Herrera     Dx: Metastatic gastric cancer    Cycle: 3 Day 15   Previous treatment:C3D8 on 8/23/17     Protocol: Cyramza + PACLItaxel  Ramucirumab (Cyramza) 8 mg/kg IV on Days 1 and 15  PACLItaxel (Taxol) 80 mg/m2 IV on Days 1, 8, and 15  28-day cycle    Lyssa JOYA, et al. Ramucirumab plus paclitaxel versus placebo plus paclitaxel in patients with previously treated advanced gastric or gastro-oesophageal junction adenocarcinoma ([place]): a double-blind, randomised phase 3 trial. Lancet Oncol. 2014 Oct;15(11):1224-35.      Allergies: Review of patient's allergies indicates no known allergies.  /81   Pulse 78   Temp 37.1 °C (98.8 °F)   Resp 18   Ht 1.5 m (4' 11.06\")   Wt 50.5 kg (111 lb 5.3 oz)   LMP 03/08/2009   SpO2 95%   BMI 22.44 kg/m²    Body surface area is 1.45 meters squared.    ANC~ 2130 Plt = 27k   Hgb = 11.1     SCr = 0.48mg/dL CrCl ~ 78mL/min (min Scr = 0.7)   LFT's = WNL TBili = 0.4   Urine protein = negative        Labs 8/16/17     BhCG = negative    Ramucirumab (Cyramza) 8 mg/kg x 50.5kg = 404mg   <5% difference, ok to treat with final dose = 400mg IV      PACLItaxel (Taxol) 80 mg/m² x 1.45m² = 116mg   <5% difference, OK to treat with final written dose = 116 mg IV    RAQUEL Duarte Pharm.D.                        "

## 2017-08-30 NOTE — PROGRESS NOTES
Saw pt in infusion center.  Pt inidcated in [place] that she wanted to talk with ONN.  Told pt (to the best of may ability in [place]), that I would find CRUZ Mcbride, to meet with us later.  Pt agreed.  Met with pt later who told Rae that she had been having pain to the stomach and occasionally to the back.  Pt described the pain (via Rae) as being frequent and uncomfortable to the stomach.  She couldn't tell if it happened more often with meals or correlated with any particular activites.  She called it dull and stated that it was currently much better, 2/10.  She also stated that the occasional back pain was now gone.  Rae then asked her if she was having trouble affording food.  She told her that she was.  LICHA and Rae went to [facility] to see if pt was a current pt and could receive assistance.  [facility] was able and did assist.  Pt indicated no other concerns.  Plan to follow up with pt prn.  [place]

## 2017-08-30 NOTE — PROGRESS NOTES
Chemotherapy Verification - PRIMARY RN      Height = 150 cm  Weight = 50.5 kg  BSA = 1.45 m2       Medication: Taxol  Dose: 80 mg/m2  Calculated Dose: 116 mg                             (In mg/m2, AUC, mg/kg)     Medication: Cyramza  Dose: 8 mg/kg  Calculated Dose: 404 mg                             (In mg/m2, AUC, mg/kg)      I confirm this process was performed independently with the BSA and all final chemotherapy dosing calculations congruent.  Any discrepancies of 5% or greater have been addressed with the chemotherapy pharmacist. The resolution of the discrepancy has been documented in the EPIC progress notes.

## 2017-08-30 NOTE — PROGRESS NOTES
"Pharmacy Chemotherapy Verification Note:    Patient Name: Melita Herrera      Dx: Metastatic Gastric Carcinoma        Protocol: Ramucirumab + PACLItaxel       Ramucirumab 8 mg/kg IV on days 1 and 15  PACLItaxel 80 mg/m² IV on days 1, 8, and 15  28 day cycle    Lyssa H, Raymundo K, Marcus Barraza E, et al; [place] Study [facility]. Ramucirumab plus paclitaxel versus placebo plus paclitaxel in patients with previously treated advanced gastric or gastro-oesophageal junction adenocarcinoma ([place]): a double-blind, randomised phase 3 trial. Lancet Oncol. 2014;15(11):7369-0958.     Allergies:  Review of patient's allergies indicates no known allergies.     /81   Pulse 78   Temp 37.1 °C (98.8 °F)   Resp 18   Ht 1.5 m (4' 11.06\")   Wt 50.5 kg (111 lb 5.3 oz)   LMP 03/08/2009   SpO2 95%   BMI 22.44 kg/m²    Body surface area is 1.45 meters squared.     Labs 8/30/17  ANC ~2130  Plt = 237k  Hgb = 11.1   SCr = 0.48 mg/dL  CrCl ~78 mL/min (using min SCr 0.7 mg/dL and current weight)  LFT = WNL  TBili = 0.4  Urine protein = negative       Drug Order   (Drug name, dose, route, IV Fluid & volume, frequency, number of doses) Cycle: 3, Day 15  Previous treatment: C3D8 = 8/23/17     Medication = Ramucirumab (Cyramza)  Base Dose= 8 mg/kg  Calc Dose: Base Dose x 50.5 kg = 404 mg  Final Dose = 400 mg  Route = IV  Fluid & Volume =  mL  Admin Duration = Over 60 minutes          <5% difference, ok to treat with final written dose   Medication = PACLItaxel (Taxol)  Base Dose = 80 mg/m²  Calc Dose: Base Dose x 1.45 m² = 116 mg  Final Dose = 116 mg  Route = IV  Fluid & Volume =  mL  Admin Duration = Over 60 minutes          <5% difference, ok to treat with final written dose       By my signature below, I confirm this process was performed independently with the BSA and all final chemotherapy dosing calculations congruent. I have reviewed the above chemotherapy order and that my calculation of the final dose and BSA " (when applicable) corroborate those calculations of the  pharmacist.     Pietro Marsh, PharmD

## 2017-08-30 NOTE — PROGRESS NOTES
Chemotherapy Verification - SECONDARY RN       Height = 150 cm  Weight = 50.5 kg  BSA = 1.45 m2       Medication: Ramucirumab  Dose: 8 mg/kg  Calculated Dose: 404 mg                             (In mg/m2, AUC, mg/kg)     Medication: Taxol  Dose: 80 mg/m2  Calculated Dose: 116 mg                             (In mg/m2, AUC, mg/kg)    I confirm that this process was performed independently.

## 2017-08-30 NOTE — PROGRESS NOTES
On August 30, 2017,  Rae Alejandro and Oncology Nurse Navigator Lashanda Rodrigez met with pt. while pt. was receiving [facility] at [facility].  Pt. reported her back and stomach pain to be her area of concern.  Pt. states pain medication helps take away the pain.  Pt. had financial concerns being able to afford daily living expenses.  CRUZ Alejandro and LICHA Rodrigez were able to provide pt. with a check in the amount of $300.  No other barriers to care brought forward by patient at this time.

## 2017-09-06 ENCOUNTER — HOSPITAL ENCOUNTER (OUTPATIENT)
Dept: RADIOLOGY | Facility: MEDICAL CENTER | Age: 47
End: 2017-09-06
Attending: INTERNAL MEDICINE
Payer: MEDICAID

## 2017-09-06 ENCOUNTER — TELEPHONE (OUTPATIENT)
Dept: HEMATOLOGY ONCOLOGY | Facility: MEDICAL CENTER | Age: 47
End: 2017-09-06

## 2017-09-06 DIAGNOSIS — C79.9 METASTASIS FROM GASTRIC CANCER (HCC): ICD-10-CM

## 2017-09-06 DIAGNOSIS — C16.9 METASTASIS FROM GASTRIC CANCER (HCC): ICD-10-CM

## 2017-09-06 PROCEDURE — 71260 CT THORAX DX C+: CPT

## 2017-09-06 PROCEDURE — 700117 HCHG RX CONTRAST REV CODE 255: Performed by: INTERNAL MEDICINE

## 2017-09-06 RX ADMIN — IOHEXOL 90 ML: 350 INJECTION, SOLUTION INTRAVENOUS at 16:45

## 2017-09-06 NOTE — TELEPHONE ENCOUNTER
"Ms. Reyes called stating that she hasn't been feeling well. She stated she feels \"down\", \"tired\", and \"skinny\". \"I'm losing more weight and I can't even stand on my feet's with out feeling like I'm going to fall over\".  She states that she doesn't have a fever and also no chills. I informed Dr. Medrano and he would like Ms. Reyes to have a STAT CT. Ms. Reyes understood. Her CT is scheduled for Wednesday the 6 th of September 1545 check in time for a 1600 appointment.   "

## 2017-09-07 ENCOUNTER — TELEPHONE (OUTPATIENT)
Dept: HEMATOLOGY ONCOLOGY | Facility: MEDICAL CENTER | Age: 47
End: 2017-09-07

## 2017-09-07 ENCOUNTER — HOSPITAL ENCOUNTER (OUTPATIENT)
Dept: RADIOLOGY | Facility: MEDICAL CENTER | Age: 47
End: 2017-09-07
Attending: INTERNAL MEDICINE
Payer: MEDICAID

## 2017-09-07 DIAGNOSIS — C79.9 METASTATIC CANCER (HCC): ICD-10-CM

## 2017-09-07 DIAGNOSIS — C16.9 MALIGNANT NEOPLASM OF STOMACH, UNSPECIFIED LOCATION (HCC): ICD-10-CM

## 2017-09-07 DIAGNOSIS — C16.9 METASTASIS FROM GASTRIC CANCER (HCC): ICD-10-CM

## 2017-09-07 DIAGNOSIS — C80.1 CANCER (HCC): ICD-10-CM

## 2017-09-07 DIAGNOSIS — C79.9 METASTASIS FROM GASTRIC CANCER (HCC): ICD-10-CM

## 2017-09-07 PROCEDURE — A9579 GAD-BASE MR CONTRAST NOS,1ML: HCPCS | Performed by: INTERNAL MEDICINE

## 2017-09-07 PROCEDURE — 700117 HCHG RX CONTRAST REV CODE 255: Performed by: INTERNAL MEDICINE

## 2017-09-07 PROCEDURE — 70553 MRI BRAIN STEM W/O & W/DYE: CPT

## 2017-09-07 RX ORDER — DEXAMETHASONE 4 MG/1
4 TABLET ORAL 2 TIMES DAILY
Qty: 10 TAB | Refills: 0 | Status: SHIPPED | OUTPATIENT
Start: 2017-09-07 | End: 2017-09-12

## 2017-09-07 RX ADMIN — GADODIAMIDE 10 ML: 287 INJECTION INTRAVENOUS at 18:21

## 2017-09-07 NOTE — TELEPHONE ENCOUNTER
I contacted [facility] to request test to be ran on the patient's stomach biopsy per Dr Medrano. I was informed they will have to see if the test can be ran since the biopsy is from August 2016. I was informed to fax the request to     [phone]

## 2017-09-07 NOTE — TELEPHONE ENCOUNTER
Called Ms. Reyes to inform her that she has a Peripheral lab appointment on 9/12/17 1130 and at 1300 she has an appointment with . I also informed her that Dr. Medrano would like for her daughters to join the appointment.

## 2017-09-08 ENCOUNTER — TELEPHONE (OUTPATIENT)
Dept: HEMATOLOGY ONCOLOGY | Facility: MEDICAL CENTER | Age: 47
End: 2017-09-08

## 2017-09-08 RX ORDER — LIDOCAINE HYDROCHLORIDE 10 MG/ML
0.5 INJECTION, SOLUTION INFILTRATION; PERINEURAL SEE ADMIN INSTRUCTIONS
Status: CANCELLED | OUTPATIENT
Start: 2017-09-12

## 2017-09-08 RX ORDER — ONDANSETRON 8 MG/1
8 TABLET, ORALLY DISINTEGRATING ORAL
Status: CANCELLED | OUTPATIENT
Start: 2017-09-12

## 2017-09-08 RX ORDER — ONDANSETRON 2 MG/ML
4 INJECTION INTRAMUSCULAR; INTRAVENOUS
Status: CANCELLED | OUTPATIENT
Start: 2017-09-12

## 2017-09-08 RX ORDER — PROCHLORPERAZINE MALEATE 10 MG
10 TABLET ORAL EVERY 6 HOURS PRN
Status: CANCELLED | OUTPATIENT
Start: 2017-09-12

## 2017-09-08 RX ORDER — SODIUM CHLORIDE 9 MG/ML
INJECTION, SOLUTION INTRAVENOUS CONTINUOUS
Status: CANCELLED | OUTPATIENT
Start: 2017-09-12

## 2017-09-08 NOTE — TELEPHONE ENCOUNTER
Called pt regarding new onset back pain  Discussed with Dr. Merdano  Educated pt to take pain medications as prescribed and that this is OK per Dr. Medrano.  Pt is at [facility] for further assessment a this time as reccommended.

## 2017-09-08 NOTE — TELEPHONE ENCOUNTER
Cali Alvarado at [facility]:     They are able to run the PDL-1 test. In regards to [facility], one non tumor block and one tumor block. Since [facility] only has one block they will not be able to run that specific test.

## 2017-09-08 NOTE — TELEPHONE ENCOUNTER
"Ms. Reyes called stating she has \" back pain\" and she also can't \"stand\" . I asked Ms. Reyes if she has a fever or chills. She stated she doesn't know if she has a fever but she has no chills. I informed Bina Mckeon MA and Bina informed me to have MS. Reyes go the e.r. Ms. Reyes was up set and stated \" Oh my god I've already gone twice this week\"   She states she isn't sure if she is going to e.r  "

## 2017-09-11 DIAGNOSIS — C16.9 METASTASIS FROM GASTRIC CANCER (HCC): ICD-10-CM

## 2017-09-11 DIAGNOSIS — C79.9 METASTASIS FROM GASTRIC CANCER (HCC): ICD-10-CM

## 2017-09-12 ENCOUNTER — NON-PROVIDER VISIT (OUTPATIENT)
Dept: HEMATOLOGY ONCOLOGY | Facility: MEDICAL CENTER | Age: 47
End: 2017-09-12
Payer: MEDICAID

## 2017-09-12 ENCOUNTER — OFFICE VISIT (OUTPATIENT)
Dept: HEMATOLOGY ONCOLOGY | Facility: MEDICAL CENTER | Age: 47
End: 2017-09-12
Payer: MEDICAID

## 2017-09-12 VITALS — HEIGHT: 59 IN

## 2017-09-12 DIAGNOSIS — C16.9 METASTASIS FROM GASTRIC CANCER (HCC): ICD-10-CM

## 2017-09-12 DIAGNOSIS — C79.9 METASTASIS FROM GASTRIC CANCER (HCC): ICD-10-CM

## 2017-09-12 PROCEDURE — 99214 OFFICE O/P EST MOD 30 MIN: CPT | Performed by: INTERNAL MEDICINE

## 2017-09-12 ASSESSMENT — PAIN SCALES - GENERAL: PAINLEVEL: 10=SEVERE PAIN

## 2017-09-12 NOTE — PROGRESS NOTES
Melita Herrera is a 46 y.o. female here for a non-provider visit for: Lab Draws  on 9/12/2017 at 11:12 AM    Procedure Performed: Venipuncture     Anatomical site: Left Antecubital Area (AC)    Equipment used: 21g    Labs drawn: CBC w/diff, CMP, TSH and Free Thyroxine    Ordering Provider: Dr. Ramana Cuenca By: Bina Meyers, Jordan Ass't   No complications

## 2017-09-12 NOTE — PROGRESS NOTES
Date of visit: 9/12/2017  1:20 PM      Chief Complaint-  metastatic gastric carcinoma      History of presenting illness: Melita Herrera  is a 46 y.o. year old female who is here for  discussion regarding her restaging scan results. She is an unfortunate lady with chemotherapy refractory metastatic gastric carcinoma. Please refer to the note from 8/20/2/2017 for further details. She has been having worsening pain and severe fatigue. A restaging CT scan shows significant progression in the liver. She also had nonspecific neurological symptoms. An MRI of the brain did not reveal any metastatic disease. She was accompanied by her daughters today.    She had dramatic decline in performance status and is mostly bedbound at this point. She has been reluctant to use opiate medication. But she is starting to use more for pain control. Significant weight loss and anorexia.    Past Medical History:      Past Medical History:   Diagnosis Date   • Metastasis from gastric cancer (CMS-HCC) 5/19/2017       Past surgical history:     No past surgical history on file.    Allergies:       Review of patient's allergies indicates no known allergies.    Medications:         Current Outpatient Prescriptions   Medication Sig Dispense Refill   • oxycodone-acetaminophen (PERCOCET) 5-325 MG Tab Take 1 Tab by mouth every 8 hours as needed. 60 Tab 0   • lorazepam (ATIVAN) 0.5 MG Tab Take 1 Tab by mouth every 8 hours as needed for Anxiety for up to 5 doses. 30 Tab 0   • FOLIC ACID PO Take  by mouth.     • ondansetron (ZOFRAN) 4 MG Tab tablet Take 1 Tab by mouth every four hours as needed for Nausea/Vomiting. 30 Tab 3   • prochlorperazine (COMPAZINE) 10 MG Tab Take 1 Tab by mouth every 6 hours as needed. 30 Tab 3     No current facility-administered medications for this visit.          Social History:     Social History     Social History   • Marital status: Single     Spouse name: N/A   • Number of children: N/A   • Years of  education: N/A     Occupational History   • Not on file.     Social History Main Topics   • Smoking status: Never Smoker   • Smokeless tobacco: Never Used   • Alcohol use No   • Drug use: No   • Sexual activity: Not on file     Other Topics Concern   • Not on file     Social History Narrative   • No narrative on file       Family History:    No family history on file.    Review of Systems:  All other review of systems are negative except what was mentioned above in the HPI.    Constitutional: Negative for fever, chills, Positive for weight loss and malaise/fatigue.    HEENT: No new auditory or visual complaints. No sore throat and neck pain.     Respiratory: Negative for cough, sputum production, shortness of breath and wheezing.    Cardiovascular: Negative for chest pain, palpitations, orthopnea and leg swelling.    Gastrointestinal: Negative for heartburn, nausea, vomiting. Positive for abdominal pain.    Genitourinary: Negative for dysuria, hematuria    Musculoskeletal: No new arthralgias or myalgias   Skin: Negative for rash and itching.    Neurological: Negative for focal weakness and headaches.    Endo/Heme/Allergies: No abnormal bleed/bruise.    Psychiatric/Behavioral: No new depression/anxiety.    Physical Exam:  Vitals: LMP 03/08/2009     General: Not in acute distress, alert and oriented x 3. Appears fatigued  HEENT: No pallor, icterus. Oropharynx clear.   Neck: Supple, no palpable masses.  Lymph nodes: No palpable cervical, supraclavicular, axillary or inguinal lymphadenopathy.    CVS: regular rate and rhythm, no rubs or gallops  RESP: Clear to auscultate bilaterally, no wheezing or crackles.   ABD: Soft, nonspecific generalized tenderness  EXT: No edema or cyanosis  CNS: Alert and oriented x3, No focal deficits.  Skin- No rash      Labs:   No visits with results within 1 Week(s) from this visit.   Latest known visit with results is:   [facility] on 08/30/2017   Component Date Value Ref  Range Status   • WBC 08/30/2017 3.3* 4.8 - 10.8 K/uL Final   • RBC 08/30/2017 4.17* 4.20 - 5.40 M/uL Final   • Hemoglobin 08/30/2017 11.1* 12.0 - 16.0 g/dL Final   • Hematocrit 08/30/2017 35.4* 37.0 - 47.0 % Final   • MCV 08/30/2017 84.9  81.4 - 97.8 fL Final   • MCH 08/30/2017 26.6* 27.0 - 33.0 pg Final   • MCHC 08/30/2017 31.4* 33.6 - 35.0 g/dL Final   • RDW 08/30/2017 57.7* 35.9 - 50.0 fL Final   • Platelet Count 08/30/2017 237  164 - 446 K/uL Final   • MPV 08/30/2017 10.1  9.0 - 12.9 fL Final   • Neutrophils-Polys 08/30/2017 65.00  44.00 - 72.00 % Final   • Lymphocytes 08/30/2017 24.40  22.00 - 41.00 % Final   • Monocytes 08/30/2017 8.50  0.00 - 13.40 % Final   • Eosinophils 08/30/2017 1.50  0.00 - 6.90 % Final   • Basophils 08/30/2017 0.30  0.00 - 1.80 % Final   • Immature Granulocytes 08/30/2017 0.30  0.00 - 0.90 % Final   • Nucleated RBC 08/30/2017 0.00  /100 WBC Final   • Neutrophils (Absolute) 08/30/2017 2.13  2.00 - 7.15 K/uL Final   • Lymphs (Absolute) 08/30/2017 0.80* 1.00 - 4.80 K/uL Final   • Monos (Absolute) 08/30/2017 0.28  0.00 - 0.85 K/uL Final   • Eos (Absolute) 08/30/2017 0.05  0.00 - 0.51 K/uL Final   • Baso (Absolute) 08/30/2017 0.01  0.00 - 0.12 K/uL Final   • Immature Granulocytes (abs) 08/30/2017 0.01  0.00 - 0.11 K/uL Final   • NRBC (Absolute) 08/30/2017 0.00  K/uL Final   • Sodium 08/30/2017 138  135 - 145 mmol/L Final   • Potassium 08/30/2017 3.7  3.6 - 5.5 mmol/L Final   • Chloride 08/30/2017 107  96 - 112 mmol/L Final   • Co2 08/30/2017 24  20 - 33 mmol/L Final   • Anion Gap 08/30/2017 7.0  0.0 - 11.9 Final   • Glucose 08/30/2017 106* 65 - 99 mg/dL Final   • Bun 08/30/2017 14  8 - 22 mg/dL Final   • Creatinine 08/30/2017 0.48* 0.50 - 1.40 mg/dL Final   • Calcium 08/30/2017 9.0  8.5 - 10.5 mg/dL Final   • AST(SGOT) 08/30/2017 34  12 - 45 U/L Final   • ALT(SGPT) 08/30/2017 14  2 - 50 U/L Final   • Alkaline Phosphatase 08/30/2017 72  30 - 99 U/L Final   • Total Bilirubin 08/30/2017 0.4   0.1 - 1.5 mg/dL Final   • Albumin 08/30/2017 3.6  3.2 - 4.9 g/dL Final   • Total Protein 08/30/2017 6.3  6.0 - 8.2 g/dL Final   • Globulin 08/30/2017 2.7  1.9 - 3.5 g/dL Final   • A-G Ratio 08/30/2017 1.3  g/dL Final   • Color 08/30/2017 Yellow   Final   • Character 08/30/2017 Clear   Final   • Specific Gravity 08/30/2017 1.027  <1.035 Final   • Ph 08/30/2017 5.0  5.0 - 8.0 Final   • Glucose 08/30/2017 Negative  Negative mg/dL Final   • Ketones 08/30/2017 Negative  Negative mg/dL Final   • Protein 08/30/2017 Negative  Negative mg/dL Final   • Bilirubin 08/30/2017 Negative  Negative Final   • Urobilinogen, Urine 08/30/2017 1.0  Negative Final   • Nitrite 08/30/2017 Negative  Negative Final   • Leukocyte Esterase 08/30/2017 Small* Negative Final   • Occult Blood 08/30/2017 Negative  Negative Final   • Micro Urine Req 08/30/2017 Microscopic   Final   • WBC 08/30/2017 2-5  /hpf Final    Comment: Female  <12 Yr 0-2  >12 Yr 0-5  Male   None     • RBC 08/30/2017 0-2  /hpf Final    Comment: Female  >12 Yr 0-2  Male   None     • Bacteria 08/30/2017 Few* None /hpf Final   • Epithelial Cells 08/30/2017 Few  /hpf Final   • Mucous Threads 08/30/2017 Many  /hpf Final   • Hyaline Cast 08/30/2017 0-2  /lpf Final   • GFR If  08/30/2017 >60  >60 mL/min/1.73 m 2 Final   • GFR If Non  08/30/2017 >60  >60 mL/min/1.73 m 2 Final             Assessment and Plan:    Chemotherapy refractory Metastatic gastric carcinoma- unfortunately, she is having significant disease progression. She also had dramatic decline in her performance status. Reviewed the CT findings with the patient and the family. She appears to be in the process of actively dying and I suspect that her life expectancy may be days to weeks. I ordered MSI testing from the prior outside biopsy specimen. However, there was not enough material. Regardless, given her significant progression and decline in performance status. She is not a candidate for  immunotherapy.    I had prior discussion with them about the possibility of her needing hospice enrollment soon. The patient and her daughters were understandably tearful. Reiterated that she should spend most of her time at home rather than going to different emergency room. Informed her that the hospice will manage pain, anxiety and other related symptoms and they voiced understanding. I will put in a referral for hospice.  Spent 35 minutes today with the patient and the family, mainly counseling about hospice and answering their questions.  She agreed and verbalized her agreement and understanding with the current plan.  I answered all questions and concerns she has at this time         Please note that this dictation was created using voice recognition software. I have made every reasonable attempt to correct obvious errors, but I expect that there are errors of grammar and possibly content that I did not discover before finalizing the note.      SIGNATURES:  Ramana Medrano    CC:  HUY Pan  No ref. provider found

## 2017-09-13 ENCOUNTER — TELEPHONE (OUTPATIENT)
Dept: HEMATOLOGY ONCOLOGY | Facility: MEDICAL CENTER | Age: 47
End: 2017-09-13

## 2017-09-13 ENCOUNTER — APPOINTMENT (OUTPATIENT)
Dept: ONCOLOGY | Facility: MEDICAL CENTER | Age: 47
End: 2017-09-13
Attending: INTERNAL MEDICINE
Payer: MEDICAID

## 2017-09-13 ENCOUNTER — HOSPICE ADMISSION (OUTPATIENT)
Dept: HOSPICE | Facility: HOSPICE | Age: 47
End: 2017-09-13
Payer: MEDICAID

## 2017-09-13 NOTE — TELEPHONE ENCOUNTER
Patient daughter informs me the patient is wondering if she is ok to travel out of state. I informed her that decision is up to her and to speak to her hospice coordinator. She asked about a refill on pain medication in which I informed her she will also need to speak to Hospice. Patient agreed.

## 2017-09-20 ENCOUNTER — APPOINTMENT (OUTPATIENT)
Dept: ONCOLOGY | Facility: MEDICAL CENTER | Age: 47
End: 2017-09-20
Attending: INTERNAL MEDICINE
Payer: MEDICAID

## 2017-09-27 ENCOUNTER — APPOINTMENT (OUTPATIENT)
Dept: ONCOLOGY | Facility: MEDICAL CENTER | Age: 47
End: 2017-09-27
Attending: INTERNAL MEDICINE
Payer: MEDICAID

## 2017-09-29 ENCOUNTER — HOME CARE VISIT (OUTPATIENT)
Dept: HOSPICE | Facility: HOSPICE | Age: 47
End: 2017-09-29
Payer: MEDICAID

## 2017-09-29 ENCOUNTER — TELEPHONE (OUTPATIENT)
Dept: HEMATOLOGY ONCOLOGY | Facility: MEDICAL CENTER | Age: 47
End: 2017-09-29

## 2017-09-29 NOTE — TELEPHONE ENCOUNTER
Hospice has tried to reach out number of times can not leave a message on her phone. Hospice has also reached out to the patients emergency contacts left a message to please have patient  call them, and hospice has not heard back from them either.

## 2017-10-04 ENCOUNTER — HOSPITAL ENCOUNTER (EMERGENCY)
Facility: MEDICAL CENTER | Age: 47
End: 2017-10-04
Payer: MEDICAID

## 2017-10-05 ENCOUNTER — HOME CARE VISIT (OUTPATIENT)
Dept: HOSPICE | Facility: HOSPICE | Age: 47
End: 2017-10-05
Payer: MEDICAID

## 2021-06-23 NOTE — NON-PROVIDER
Medical Week 4 Survey      Responses   [facility] patient discharged from?  [facility]   Does the patient have one of the following disease processes/diagnoses(primary or secondary)?  Other   Week 4 attempt successful?  No          Charley Garcia LPN   Melita Herrera is a 46 y.o. female here for a non-provider visit for: Lab Draws  on 7/11/2017 at 3:39 PM    Procedure Performed: Venipuncture     Anatomical site: Right Antecubital Area (AC)    Equipment used: 21G     Labs drawn: , CBC w/diff and CMP    Ordering Provider: JUSTIN Willson By: Magda Barker, Jordan Ass't

## 2022-10-05 NOTE — PROGRESS NOTES
"On July 17, 2017,  Rae Alejandro received telephone call from JUSTIN Willson regarding concerns for pt.  CRUZ Alejandro and JUSTIN Aguilera met with the pt. and explained concerns for pt. as she has expressed she does not want to live anymore.  CRUZ Alejandro explained the need to take pt. to the ER for evaluation.  Pt. stated she has thoughts of wanting to hurt herself but proceeded to state she will be fine.  Pt. stated she has a lot of \"things to do\" and cannot go to the ER.  CRUZ Alejandro and JUSTIN Aguilera proceeded to complete [employer] hold paperwork and inform pt. they would need to take her to ER.  Pt. began walking out of [facility] and CRUZ Aguilera proceeded to folllow pt.  CRUZ Alejandro explained to pt. she would need to contact security or law enforcement if pt. did not agree to come to ER with them.  Pt. agreed but stated she would not stay there long.    " Discharged